# Patient Record
Sex: MALE | Race: OTHER | HISPANIC OR LATINO | Employment: UNEMPLOYED | URBAN - METROPOLITAN AREA
[De-identification: names, ages, dates, MRNs, and addresses within clinical notes are randomized per-mention and may not be internally consistent; named-entity substitution may affect disease eponyms.]

---

## 2017-10-02 ENCOUNTER — GENERIC CONVERSION - ENCOUNTER (OUTPATIENT)
Dept: OTHER | Facility: OTHER | Age: 42
End: 2017-10-02

## 2017-10-02 DIAGNOSIS — R10.13 EPIGASTRIC PAIN: ICD-10-CM

## 2017-10-02 DIAGNOSIS — I10 ESSENTIAL (PRIMARY) HYPERTENSION: ICD-10-CM

## 2017-10-07 ENCOUNTER — HOSPITAL ENCOUNTER (EMERGENCY)
Facility: HOSPITAL | Age: 42
Discharge: HOME | End: 2017-10-07
Attending: EMERGENCY MEDICINE | Admitting: EMERGENCY MEDICINE
Payer: COMMERCIAL

## 2017-10-07 ENCOUNTER — APPOINTMENT (EMERGENCY)
Dept: RADIOLOGY | Facility: HOSPITAL | Age: 42
End: 2017-10-07
Payer: COMMERCIAL

## 2017-10-07 VITALS
WEIGHT: 233 LBS | TEMPERATURE: 98.1 F | HEIGHT: 67 IN | RESPIRATION RATE: 16 BRPM | SYSTOLIC BLOOD PRESSURE: 127 MMHG | OXYGEN SATURATION: 99 % | DIASTOLIC BLOOD PRESSURE: 84 MMHG | HEART RATE: 84 BPM | BODY MASS INDEX: 36.57 KG/M2

## 2017-10-07 DIAGNOSIS — K52.9 GASTROENTERITIS: ICD-10-CM

## 2017-10-07 DIAGNOSIS — R10.9 ABDOMINAL PAIN: Primary | ICD-10-CM

## 2017-10-07 LAB
ALBUMIN SERPL BCP-MCNC: 3.7 G/DL (ref 3.5–5)
ALP SERPL-CCNC: 73 U/L (ref 46–116)
ALT SERPL W P-5'-P-CCNC: 42 U/L (ref 12–78)
ANION GAP SERPL CALCULATED.3IONS-SCNC: 5 MMOL/L (ref 4–13)
AST SERPL W P-5'-P-CCNC: 28 U/L (ref 5–45)
BACTERIA UR QL AUTO: ABNORMAL /HPF
BASOPHILS # BLD AUTO: 0.03 THOUSANDS/ΜL (ref 0–0.1)
BASOPHILS NFR BLD AUTO: 0 % (ref 0–1)
BILIRUB SERPL-MCNC: 0.33 MG/DL (ref 0.2–1)
BILIRUB UR QL STRIP: NEGATIVE
BUN SERPL-MCNC: 14 MG/DL (ref 5–25)
CALCIUM SERPL-MCNC: 8.8 MG/DL (ref 8.3–10.1)
CHLORIDE SERPL-SCNC: 103 MMOL/L (ref 100–108)
CLARITY UR: CLEAR
CO2 SERPL-SCNC: 29 MMOL/L (ref 21–32)
COLOR UR: YELLOW
CREAT SERPL-MCNC: 0.93 MG/DL (ref 0.6–1.3)
EOSINOPHIL # BLD AUTO: 0.18 THOUSAND/ΜL (ref 0–0.61)
EOSINOPHIL NFR BLD AUTO: 2 % (ref 0–6)
ERYTHROCYTE [DISTWIDTH] IN BLOOD BY AUTOMATED COUNT: 13.3 % (ref 11.6–15.1)
GFR SERPL CREATININE-BSD FRML MDRD: 102 ML/MIN/1.73SQ M
GLUCOSE SERPL-MCNC: 113 MG/DL (ref 65–140)
GLUCOSE UR STRIP-MCNC: NEGATIVE MG/DL
HCT VFR BLD AUTO: 44.8 % (ref 36.5–49.3)
HGB BLD-MCNC: 15.4 G/DL (ref 12–17)
HGB UR QL STRIP.AUTO: ABNORMAL
HYALINE CASTS #/AREA URNS LPF: ABNORMAL /LPF
KETONES UR STRIP-MCNC: NEGATIVE MG/DL
LEUKOCYTE ESTERASE UR QL STRIP: NEGATIVE
LIPASE SERPL-CCNC: 194 U/L (ref 73–393)
LYMPHOCYTES # BLD AUTO: 2.49 THOUSANDS/ΜL (ref 0.6–4.47)
LYMPHOCYTES NFR BLD AUTO: 30 % (ref 14–44)
MCH RBC QN AUTO: 30 PG (ref 26.8–34.3)
MCHC RBC AUTO-ENTMCNC: 34.4 G/DL (ref 31.4–37.4)
MCV RBC AUTO: 87 FL (ref 82–98)
MONOCYTES # BLD AUTO: 0.74 THOUSAND/ΜL (ref 0.17–1.22)
MONOCYTES NFR BLD AUTO: 9 % (ref 4–12)
NEUTROPHILS # BLD AUTO: 4.74 THOUSANDS/ΜL (ref 1.85–7.62)
NEUTS SEG NFR BLD AUTO: 59 % (ref 43–75)
NITRITE UR QL STRIP: NEGATIVE
NON-SQ EPI CELLS URNS QL MICRO: ABNORMAL /HPF
NRBC BLD AUTO-RTO: 0 /100 WBCS
PH UR STRIP.AUTO: 7 [PH] (ref 4.5–8)
PLATELET # BLD AUTO: 253 THOUSANDS/UL (ref 149–390)
PMV BLD AUTO: 9.7 FL (ref 8.9–12.7)
POTASSIUM SERPL-SCNC: 3.7 MMOL/L (ref 3.5–5.3)
PROT SERPL-MCNC: 7.5 G/DL (ref 6.4–8.2)
PROT UR STRIP-MCNC: ABNORMAL MG/DL
RBC # BLD AUTO: 5.13 MILLION/UL (ref 3.88–5.62)
RBC #/AREA URNS AUTO: ABNORMAL /HPF
SODIUM SERPL-SCNC: 137 MMOL/L (ref 136–145)
SP GR UR STRIP.AUTO: 1.02 (ref 1–1.03)
UROBILINOGEN UR QL STRIP.AUTO: 0.2 E.U./DL
WBC # BLD AUTO: 8.19 THOUSAND/UL (ref 4.31–10.16)
WBC #/AREA URNS AUTO: ABNORMAL /HPF

## 2017-10-07 PROCEDURE — 99284 EMERGENCY DEPT VISIT MOD MDM: CPT

## 2017-10-07 PROCEDURE — 36415 COLL VENOUS BLD VENIPUNCTURE: CPT | Performed by: EMERGENCY MEDICINE

## 2017-10-07 PROCEDURE — 80053 COMPREHEN METABOLIC PANEL: CPT | Performed by: EMERGENCY MEDICINE

## 2017-10-07 PROCEDURE — 83690 ASSAY OF LIPASE: CPT | Performed by: EMERGENCY MEDICINE

## 2017-10-07 PROCEDURE — 74176 CT ABD & PELVIS W/O CONTRAST: CPT

## 2017-10-07 PROCEDURE — 85025 COMPLETE CBC W/AUTO DIFF WBC: CPT | Performed by: EMERGENCY MEDICINE

## 2017-10-07 PROCEDURE — 81001 URINALYSIS AUTO W/SCOPE: CPT

## 2017-10-07 RX ORDER — LISINOPRIL 5 MG/1
5 TABLET ORAL DAILY
COMMUNITY
End: 2018-07-10 | Stop reason: SDUPTHER

## 2017-10-07 NOTE — DISCHARGE INSTRUCTIONS
Dolor abdominal, cuidados ambulatorios   INFORMACIÓN GENERAL:   El dolor abdominal  puede ser sordo, molesto o [place]  Puede sentir dolor en wilmar wolfgang del abdomen o en todo el abdomen  El dolor puede deberse a ciertos estados juani estreñimiento, sensibilidad o intoxicación alimentaria, infección o wilmar obstrucción  Asimismo, el dolor abdominal puede deberse a wilmar hernia, apendicitis o úlcera  La causa del dolor abdominal puede ser desconocida  Busque cuidados inmediatos para los siguientes síntomas:   · Casper dolor de pecho o falta de aliento    · Dolor pulsátil en el abdomen superior o en la parte inferior de la espalda que de repente es alejandro    · Dolor que se localiza en el abdomen inferior derecho y empeora con el movimiento    · Fiebre por encima de 100 4°F (38°C) o escalofríos martita    · Vómito de todo lo que usted come y scott    · Dolor que no mejora o más kimberley empeora reggie las siguientes 8 a 12 horas    · Josh en el vómito o heces que se estrella negras y alquitranadas    · La piel o el olivares de los ojos se vuelven amarillentos    · Grandes cantidades de sangrado vaginal que no es mtz periodo menstrual  El tratamiento para el dolor abdominal  puede llegar a incluir medicamentos para calmar mtz estómago, prevenir el vómito o disminuir el dolor  Programe wilmar nanda con mtz proveedor de [employer] se le haya indicado: Anote nathan preguntas para que se acuerde de hacerlas reggie nathan visitas  ACUERDOS SOBRE MTZ CUIDADO:   Usted tiene el derecho de participar en la planificación de mtz cuidado  Aprenda todo lo que pueda sobre mtz condición y juani darle tratamiento  Discuta con nathan médicos nathan opciones de tratamiento para juntos decidir el cuidado que usted quiere recibir  Usted siempre tiene el derecho a rechazar mtz tratamiento  Esta información es sólo para uso en educación  Mtz intención no es darle un consejo médico sobre enfermedades o tratamientos   Colsulte con mtz [employer] farm[employer] antes de seguir cualquier régimen médico para saber si es seguro y efectivo para usted  © 2014 [address] [employer] is for End User's use only and may not be sold, redistributed or otherwise used for commercial purposes  All illustrations and images included in CareNotes® are the copyrighted property of A D A M , Inc  or [address]

## 2017-10-07 NOTE — ED NOTES
Dr Jocelyne Landau and Dr Neda Bass at bedside with ultrasound        Emerick Homans, RN  10/07/17 1472

## 2017-10-07 NOTE — ED PROVIDER NOTES
History  Chief Complaint   Patient presents with    Flank Pain     left flank pain since monday, denies any urinary symptoms       This is a 51-year-old Colombian-speaking male with past medical history of hypertension who presents to the emergency department with a 5 day history of left-sided flank left upper quadrant, and epigastric abdominal pain  Patient has never had anything like this in the past describes the pain as a sharp pain that has been waxing and waning in nature symptoms started a radiates to his back at times  Patient denies any urinary symptoms including dysuria or hematuria  Patient denies any nausea or vomiting, constipation, had 1 episode of watery diarrhea this morning  Denies any chest pain or shortness of breath  Patient denies any recent illnesses, fevers, chills, headaches or vision changes  History provided by:  Patient   used: No    Flank Pain   Pain location:  L flank, LUQ and epigastric  Pain quality: sharp    Timing:  Constant  Progression:  Waxing and waning  Chronicity:  New  Relieved by:  Nothing  Worsened by:  Nothing  Ineffective treatments:  None tried  Associated symptoms: diarrhea    Associated symptoms: no chest pain, no chills, no constipation, no dysuria, no fatigue, no fever, no hematuria, no nausea, no shortness of breath, no sore throat and no vomiting        Prior to Admission Medications   Prescriptions Last Dose Informant Patient Reported? Taking?   lisinopril (ZESTRIL) 5 mg tablet   Yes Yes   Sig: Take 5 mg by mouth daily      Facility-Administered Medications: None       Past Medical History:   Diagnosis Date    Depression     Hypertension        No past surgical history on file  No family history on file  I have reviewed and agree with the history as documented      Social History   Substance Use Topics    Smoking status: Current Every Day Smoker    Smokeless tobacco: Never Used    Alcohol use No        Review of Systems Constitutional: Negative for chills, fatigue and fever  HENT: Negative for sore throat  Eyes: Negative for visual disturbance  Respiratory: Negative for shortness of breath  Cardiovascular: Negative for chest pain  Gastrointestinal: Positive for abdominal pain and diarrhea  Negative for blood in stool, constipation, nausea and vomiting  Genitourinary: Positive for flank pain  Negative for difficulty urinating, dysuria and hematuria  Musculoskeletal: Negative for arthralgias  Skin: Negative for rash  Neurological: Negative for syncope, weakness and headaches  Physical Exam  ED Triage Vitals [10/07/17 1125]   Temperature Pulse Respirations Blood Pressure SpO2   98 1 °F (36 7 °C) (!) 106 18 135/71 98 %      Temp Source Heart Rate Source Patient Position - Orthostatic VS BP Location FiO2 (%)   Oral Monitor Sitting Left arm --      Pain Score       8           Physical Exam   Constitutional: He is oriented to person, place, and time  He appears well-developed and well-nourished  HENT:   Head: Normocephalic and atraumatic  Eyes: Conjunctivae and EOM are normal  No scleral icterus  Neck: Normal range of motion  Neck supple  Cardiovascular: Normal rate and regular rhythm  No murmur heard  Pulmonary/Chest: Effort normal and breath sounds normal    Abdominal: Soft  Bowel sounds are normal  There is no tenderness  Musculoskeletal: Normal range of motion  Neurological: He is alert and oriented to person, place, and time  Skin: Skin is warm and dry  Psychiatric: He has a normal mood and affect  Nursing note and vitals reviewed        ED Medications  Medications - No data to display    Diagnostic Studies  Labs Reviewed   URINE MICROSCOPIC - Abnormal        Result Value Ref Range Status    RBC, UA 2-4 (*) None Seen, 0-5 /hpf Final    WBC, UA None Seen  None Seen, 0-5, 5-55, 5-65 /hpf Final    Epithelial Cells None Seen  None Seen, Occasional /hpf Final    Bacteria, UA None Seen None Seen, Occasional /hpf Final    Hyaline Casts, UA None Seen  None Seen /lpf Final   ED URINE MACROSCOPIC - Abnormal     Protein, UA Trace (*) Negative mg/dl Final    Blood, UA Trace (*) Negative Final    Color, UA Yellow   Final    Clarity, UA Clear   Final    pH, UA 7 0  4 5 - 8 0 Final    Leukocytes, UA Negative  Negative Final    Nitrite, UA Negative  Negative Final    Glucose, UA Negative  Negative mg/dl Final    Ketones, UA Negative  Negative mg/dl Final    Urobilinogen, UA 0 2  0 2, 1 0 E U /dl E U /dl Final    Bilirubin, UA Negative  Negative Final    Specific Gravity, UA 1 020  1 003 - 1 030 Final    Narrative:     CLINITEK RESULT   CBC AND DIFFERENTIAL - Normal    WBC 8 19  4 31 - 10 16 Thousand/uL Final    RBC 5 13  3 88 - 5 62 Million/uL Final    Hemoglobin 15 4  12 0 - 17 0 g/dL Final    Hematocrit 44 8  36 5 - 49 3 % Final    MCV 87  82 - 98 fL Final    MCH 30 0  26 8 - 34 3 pg Final    MCHC 34 4  31 4 - 37 4 g/dL Final    RDW 13 3  11 6 - 15 1 % Final    MPV 9 7  8 9 - 12 7 fL Final    Platelets 851  989 - 390 Thousands/uL Final    nRBC 0  /100 WBCs Final    Neutrophils Relative 59  43 - 75 % Final    Lymphocytes Relative 30  14 - 44 % Final    Monocytes Relative 9  4 - 12 % Final    Eosinophils Relative 2  0 - 6 % Final    Basophils Relative 0  0 - 1 % Final    Neutrophils Absolute 4 74  1 85 - 7 62 Thousands/µL Final    Lymphocytes Absolute 2 49  0 60 - 4 47 Thousands/µL Final    Monocytes Absolute 0 74  0 17 - 1 22 Thousand/µL Final    Eosinophils Absolute 0 18  0 00 - 0 61 Thousand/µL Final    Basophils Absolute 0 03  0 00 - 0 10 Thousands/µL Final   LIPASE - Normal    Lipase 194  73 - 393 u/L Final   COMPREHENSIVE METABOLIC PANEL    Sodium 153  136 - 145 mmol/L Final    Potassium 3 7  3 5 - 5 3 mmol/L Final    Chloride 103  100 - 108 mmol/L Final    CO2 29  21 - 32 mmol/L Final    Anion Gap 5  4 - 13 mmol/L Final    BUN 14  5 - 25 mg/dL Final    Creatinine 0 93  0 60 - 1 30 mg/dL Final Comment: Standardized to IDMS reference method    Glucose 113  65 - 140 mg/dL Final    Comment:   If the patient is fasting, the ADA then defines impaired fasting glucose as > 100 mg/dL and diabetes as > or equal to 123 mg/dL  Specimen collection should occur prior to Sulfasalazine administration due to the potential for falsely depressed results  Specimen collection should occur prior to Sulfapyridine administration due to the potential for falsely elevated results  Calcium 8 8  8 3 - 10 1 mg/dL Final    AST 28  5 - 45 U/L Final    Comment:   Specimen collection should occur prior to Sulfasalazine administration due to the potential for falsely depressed results  ALT 42  12 - 78 U/L Final    Comment:   Specimen collection should occur prior to Sulfasalazine and/or Sulfapyridine administration due to the potential for falsely depressed results  Alkaline Phosphatase 73  46 - 116 U/L Final    Total Protein 7 5  6 4 - 8 2 g/dL Final    Albumin 3 7  3 5 - 5 0 g/dL Final    Total Bilirubin 0 33  0 20 - 1 00 mg/dL Final    eGFR 102  ml/min/1 73sq m Final    Narrative:     National Kidney Disease Education Program recommendations are as follows:  GFR calculation is accurate only with a steady state creatinine  Chronic Kidney disease less than 60 ml/min/1 73 sq  meters  Kidney failure less than 15 ml/min/1 73 sq  meters  CT renal stone study abdomen pelvis wo contrast   Final Result      No urinary tract calculi  Workstation performed: [id number]             Procedures  Procedures      Phone Consults  ED Phone Contact    ED Course  ED Course        MDM  Number of Diagnoses or Management Options  Abdominal pain: new and requires workup  Gastroenteritis: new and requires workup  Diagnosis management comments: 72-year-old male with history of hypertension presenting with abdominal pain concerning for pancreatitis versus gastritis versus nephrolithiasis    Will obtain urinalysis as well as abdominal labs including CBC, CMP, lipase  Amount and/or Complexity of Data Reviewed  Clinical lab tests: ordered and reviewed  Tests in the radiology section of CPT®: ordered and reviewed  Tests in the medicine section of CPT®: ordered and reviewed  Review and summarize past medical records: yes  Independent visualization of images, tracings, or specimens: yes      CritCare Time    Disposition  Final diagnoses:   Abdominal pain   Gastroenteritis     ED Disposition     ED Disposition Condition Comment    Discharge  [facility] discharge to home/self care  Condition at discharge: Good        Follow-up Information     Follow up With Specialties Details Why Contact Info Additional Information    [facility] Schedule an appointment as soon as possible for a visit in 2 days  [address]  [id number]  [facility] Emergency Department Emergency Medicine Go to If symptoms worsen [address]  [phone]  ED, [address] [place] [address], [id number]        Discharge Medication List as of 10/7/2017  2:03 PM      CONTINUE these medications which have NOT CHANGED    Details   lisinopril (ZESTRIL) 5 mg tablet Take 5 mg by mouth daily, Historical Med           No discharge procedures on file  ED Provider  Attending physically available and evaluated [facility]  I managed the patient along with the ED Attending      Electronically Signed by       Ana Aparicio MD  Resident  10/07/17 0668

## 2017-10-07 NOTE — ED ATTENDING ATTESTATION
Filemon Vincent MD, saw and evaluated the patient  I have discussed the patient with the resident/non-physician practitioner and agree with the resident's/non-physician practitioner's findings, Plan of Care, and MDM as documented in the resident's/non-physician practitioner's note, except where noted  All available labs and Radiology studies were reviewed  At this point I agree with the current assessment done in the Emergency Department  I have conducted an independent evaluation of this patient a history and physical is as follows: This is a 51-year-old male who presents to the emergency department with left-sided abdominal pain  The patient states that his pain began a few days ago, and was initially a mild bothersome pain  He states that it became stronger, and intermittently feels hot  He says that it waxes and wanes in intensity, sometimes coming on for 10-30 minutes at a time and then fading away  He says the pain radiates to his back only mildly  He states it is not associated with eating, and is made better with pushing on the area  He says the pain is in the left side of his abdomen, and does not radiate to his testicles  He has not had vomiting, dysuria, hematuria, but does report a episode of diarrhea in the morning  It was nonbloody  The patient has never had symptoms like this before  His only past medical history is hypertension, for which he is on an ACE-inhibitor  He has had no recent changes in his medications  His review of systems is otherwise negative in 12 systems were reviewed  On exam Vital signs were reviewed  Patient is awake, alert, interactive  The patient's pupils are equally round reactive to light  Oropharynx is clear with moist mucous membranes  Neck is supple and nontender with no adenopathy or JVD  Heart is regular with no murmurs, rubs, or gallops  Lungs are clear and equal with no wheezes, rales, or rhonchi    Abdomen is soft with mild left lateral abdomen tenderness with no left upper quadrant or left lower quadrant tenderness  The patient also has mild epigastric tenderness with no rebound, guarding, or masses  There is no CVA tenderness  The patient was completely exposed  There is no skin breakdown  There are no rashes or skin changes  Extremities are warm and well perfused with good pulses  The patient has normal strength, sensation, and cranial nerves  Impression:  Abdominal pain, bedside ultrasound with no hydronephrosis, unremarkable gallbladder    We will plan to check labs, renal protocol CT, urine    Critical Care Time  CritCare Time

## 2018-01-22 VITALS
BODY MASS INDEX: 38.19 KG/M2 | HEIGHT: 66 IN | WEIGHT: 237.66 LBS | HEART RATE: 82 BPM | TEMPERATURE: 98.8 F | SYSTOLIC BLOOD PRESSURE: 100 MMHG | DIASTOLIC BLOOD PRESSURE: 80 MMHG

## 2018-01-31 RX ORDER — OMEPRAZOLE 20 MG/1
CAPSULE, DELAYED RELEASE ORAL
Qty: 30 CAPSULE | Refills: 0 | OUTPATIENT
Start: 2018-01-31

## 2018-07-08 PROBLEM — E66.9 OBESITY (BMI 30-39.9): Status: ACTIVE | Noted: 2017-10-02

## 2018-07-08 RX ORDER — OMEPRAZOLE 20 MG/1
1 CAPSULE, DELAYED RELEASE ORAL DAILY
COMMUNITY
Start: 2017-10-02 | End: 2018-07-10 | Stop reason: SDUPTHER

## 2018-07-10 ENCOUNTER — OFFICE VISIT (OUTPATIENT)
Dept: INTERNAL MEDICINE CLINIC | Facility: CLINIC | Age: 43
End: 2018-07-10
Payer: COMMERCIAL

## 2018-07-10 VITALS
BODY MASS INDEX: 37.3 KG/M2 | HEIGHT: 67 IN | WEIGHT: 237.66 LBS | DIASTOLIC BLOOD PRESSURE: 82 MMHG | TEMPERATURE: 98.1 F | SYSTOLIC BLOOD PRESSURE: 124 MMHG | HEART RATE: 76 BPM

## 2018-07-10 DIAGNOSIS — R93.89 ABNORMAL CHEST X-RAY: ICD-10-CM

## 2018-07-10 DIAGNOSIS — R05.9 COUGH: ICD-10-CM

## 2018-07-10 DIAGNOSIS — I10 BENIGN ESSENTIAL HYPERTENSION: Primary | ICD-10-CM

## 2018-07-10 DIAGNOSIS — K62.5 RECTAL BLEEDING: ICD-10-CM

## 2018-07-10 DIAGNOSIS — E66.9 OBESITY (BMI 30-39.9): ICD-10-CM

## 2018-07-10 DIAGNOSIS — R10.13 DYSPEPSIA: ICD-10-CM

## 2018-07-10 PROCEDURE — 3074F SYST BP LT 130 MM HG: CPT | Performed by: PHYSICIAN ASSISTANT

## 2018-07-10 PROCEDURE — 3079F DIAST BP 80-89 MM HG: CPT | Performed by: PHYSICIAN ASSISTANT

## 2018-07-10 PROCEDURE — 3725F SCREEN DEPRESSION PERFORMED: CPT | Performed by: PHYSICIAN ASSISTANT

## 2018-07-10 PROCEDURE — 99213 OFFICE O/P EST LOW 20 MIN: CPT | Performed by: PHYSICIAN ASSISTANT

## 2018-07-10 RX ORDER — LISINOPRIL 5 MG/1
5 TABLET ORAL DAILY
Qty: 90 TABLET | Refills: 1 | Status: SHIPPED | OUTPATIENT
Start: 2018-07-10 | End: 2019-12-17

## 2018-07-10 RX ORDER — OMEPRAZOLE 20 MG/1
20 CAPSULE, DELAYED RELEASE ORAL DAILY
Qty: 30 CAPSULE | Refills: 0 | Status: ON HOLD | OUTPATIENT
Start: 2018-07-10 | End: 2018-10-04 | Stop reason: SDUPTHER

## 2018-07-10 NOTE — PROGRESS NOTES
Assessment/Plan:    No problem-specific Assessment & Plan notes found for this encounter  Diagnoses and all orders for this visit:    Benign essential hypertension  -     lisinopril (ZESTRIL) 5 mg tablet; Take 1 tablet (5 mg total) by mouth daily for 180 days    Obesity (BMI 30-39  9)    Dyspepsia  -     Ambulatory referral to Gastroenterology; Future  -     omeprazole (PriLOSEC) 20 mg delayed release capsule; Take 1 capsule (20 mg total) by mouth daily for 30 days    Rectal bleeding  -     Ambulatory referral to Gastroenterology; Future    Abnormal chest x-ray  -     XR chest pa & lateral; Future    Cough  -     XR chest pa & lateral; Future          Subjective:      Patient ID: Sarah Sheth is a 41 y o  male  Pt here for F/U   Admits got busy and never got his labs completed  Confirms taking BP med but was not taking daily  To make it last     C/O pain LLQ happens 3-4 X a week  Will have a cramping sensation,   States BM is normal 1-2 X a day and no pain with BM but will see some streaks of blood but not every time   Pt denies any pain with BM and the crampy pain not always associated before or after a BM  States this started in March when was in [facility], states that first time saw a lot of blood  States since then the stool is blood streaked not in toilet  Denies any blood any other time  Last episode was 1 week ago  Patient reports the 1st episode while on vacation in [facility] patient admits had a lot of different foods, some of them spicy and did have some beer  Denies ever having constipation  Denies any known FH of IBD  No weight loss    Continues to have epigastic pain and reflux, admits has not always been following the low acid diet    Does have history of hemorrhoids external many years ago        Patient also gives history of a possible abnormal chest x-ray in 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 and thinks may have been affected  Patient denies shortness of breath however he does report a cough at times  Patient denies any hemoptysis  Differential includes but not limited to: internal hemorrhoids, diverticular disease  Will need additional evaluations  Patient be referred to GI as will need both endoscopy for his ongoing refractory epigastric pain and blood in stool  The following portions of the patient's history were reviewed and updated as appropriate: allergies, current medications, past family history, past medical history, past social history, past surgical history and problem list     Review of Systems   Constitutional: Negative for appetite change, chills, fever and unexpected weight change  Eyes: Negative  Respiratory: Positive for cough  As noted in HPI not currently   Cardiovascular: Negative  Negative for chest pain and palpitations  Gastrointestinal: Positive for abdominal pain, blood in stool and rectal pain  Negative for constipation, diarrhea, nausea and vomiting  Endocrine: Negative  Genitourinary: Negative  Negative for dysuria and hematuria  Skin: Negative  Negative for rash  Neurological: Negative for dizziness, syncope and light-headedness  Psychiatric/Behavioral: Negative  Objective:      /82 (BP Location: Left arm, Patient Position: Sitting, Cuff Size: Adult)   Pulse 76   Temp 98 1 °F (36 7 °C) (Oral)   Ht 5' 7" (1 702 m)   Wt 108 kg (237 lb 10 5 oz)   BMI 37 22 kg/m²          Physical Exam   Constitutional: He is oriented to person, place, and time  He appears well-developed and well-nourished  HENT:   Head: Normocephalic and atraumatic  Eyes: Pupils are equal, round, and reactive to light  Cardiovascular: Normal rate, regular rhythm and normal heart sounds  No murmur heard  Pulmonary/Chest: Effort normal and breath sounds normal  He has no wheezes  Abdominal: Soft   Bowel sounds are normal  He exhibits no mass  There is tenderness (minimal discomfort with deep palpation LLQ)  There is no rebound and no guarding  Neurological: He is alert and oriented to person, place, and time  Skin: Skin is warm and dry  Psychiatric: He has a normal mood and affect   His behavior is normal

## 2018-07-10 NOTE — PATIENT INSTRUCTIONS
Continue Lisinopril daily  Take the omeprazole as discussed and resume low acid diet   Sched with GI as refractory reflux symptoms and rectal bleeding  Get your labs completed for blood pressure

## 2018-07-18 ENCOUNTER — OFFICE VISIT (OUTPATIENT)
Dept: GASTROENTEROLOGY | Facility: CLINIC | Age: 43
End: 2018-07-18
Payer: COMMERCIAL

## 2018-07-18 VITALS
TEMPERATURE: 98.1 F | WEIGHT: 237.6 LBS | HEART RATE: 92 BPM | SYSTOLIC BLOOD PRESSURE: 115 MMHG | BODY MASS INDEX: 37.29 KG/M2 | HEIGHT: 67 IN | DIASTOLIC BLOOD PRESSURE: 78 MMHG

## 2018-07-18 DIAGNOSIS — K62.5 RECTAL BLEEDING: ICD-10-CM

## 2018-07-18 DIAGNOSIS — R10.13 DYSPEPSIA: ICD-10-CM

## 2018-07-18 PROCEDURE — 99244 OFF/OP CNSLTJ NEW/EST MOD 40: CPT | Performed by: INTERNAL MEDICINE

## 2018-07-18 NOTE — PATIENT INSTRUCTIONS
Colon EGD scheduled with Dr Rajeev Juárez 9/6/18 at [address] [place] [address] instructions given in office

## 2018-07-18 NOTE — LETTER
July 19, 2018     Yony Bran PA-C  [address] [facility]    Patient: Isiah Bansal   YOB: 1975   Date of Visit: 7/18/2018       Dear Dr Kishan Vásquez: Thank you for referring Isiah Bansal to me for evaluation  Below are my notes for this consultation  If you have questions, please do not hesitate to call me  I look forward to following your patient along with you  Sincerely,        Alverto Lozoya MD        CC: No Recipients  Alverto Lozoya MD  7/18/2018  5:18 PM  Attested  [facility] - Outpatient Consultation  Isiah Bansal 41 y o  male MRN: [id number]  Encounter: [id number]          ASSESSMENT AND PLAN:      1  Dyspepsia  -suspect patient has underlying gastroesophageal reflux disease with possible ulcer, r/o Barretts  -recommend omeprazole 40 mg daily  -recommend upper endoscopy with biopsy to rule out H pylori given longstanding history of GERD symptoms plus alarm symptoms that include blood in stool  - Ambulatory referral to Gastroenterology    2  Rectal bleeding  -recommend colonoscopy to rule out diverticulosis, polyps, cancer, hemorrhoids    - Ambulatory referral to Gastroenterology    ______________________________________________________________________    HPI:  62-year-old male with history of hypertension tobacco abuse history of reflux times 20 years also complaining of left quadrant pain associated with occasional bloody bowel movements  Patient is about a half a pack per day smoker, denies NSAID use, alcohol use about 6 pack per week      Reflux:  -on set 20 years  -severity- moderate to severe, occurring daily  -some improvement with Nexium, worse with eating certain foods  -associated with epigastric pain left quadrant pain and blood in stool, denies nausea/vomiting, difficulty swallowing or pain upon swallowing, denies weight loss  -no previous endoscopy    Blood in stool:  -noticed blood mixed with stool as well as on tolerate toilet paper started about a year ago  -associated with left quadrant pain  -denies family history of colon cancer, no previous colonoscopy      REVIEW OF SYSTEMS:    CONSTITUTIONAL: Denies any fever, chills, rigors, and weight loss  HEENT: No earache or tinnitus  Denies hearing loss or visual disturbances  CARDIOVASCULAR: No chest pain or palpitations  RESPIRATORY: Denies any cough, hemoptysis, shortness of breath or dyspnea on exertion  GASTROINTESTINAL: As noted in the History of Present Illness  GENITOURINARY: No problems with urination  Denies any hematuria or dysuria  NEUROLOGIC: No dizziness or vertigo, denies headaches  MUSCULOSKELETAL: Denies any muscle or joint pain  SKIN: Denies skin rashes or itching  ENDOCRINE: Denies excessive thirst  Denies intolerance to heat or cold  PSYCHOSOCIAL: Denies depression or anxiety  Denies any recent memory loss         Historical Information   Past Medical History:   Diagnosis Date    Back pain, thoracic     Last Assessed [id number]    Degeneration of cervical intervertebral disc     Last Assessed [id number]    Depression     Hypertension     Myofascial pain syndrome     Last Assessed [id number]    Neck pain     Last Assessed [id number]    Trauma     Rectum    Upper back pain     Last Assessed [id number]     Past Surgical History:   Procedure Laterality Date    CARPAL TUNNEL RELEASE       Social History   History   Alcohol Use    Yes     Comment: Socially     History   Drug Use No     History   Smoking Status    Current Every Day Smoker    Packs/day: 0 50   Smokeless Tobacco    Never Used     Comment: less than 1 pack a day     Family History   Problem Relation Age of Onset    Diabetes Family     Hypertension Family     Other Family         Back Problems    Breast cancer Maternal Grandmother     Breast cancer Paternal Aunt        Meds/Allergies       Current Outpatient Prescriptions:     lisinopril (ZESTRIL) 5 mg tablet   omeprazole (PriLOSEC) 20 mg delayed release capsule    Allergies   Allergen Reactions    Dust Mite Extract     Morphine     Penicillins     Pollen Extract            Objective     Blood pressure 115/78, pulse 92, temperature 98 1 °F (36 7 °C), temperature source Tympanic, height 5' 7" (1 702 m), weight 108 kg (237 lb 9 6 oz)  Body mass index is 37 21 kg/m²  PHYSICAL EXAM:      General Appearance:   Alert, cooperative, no distress,obese   HEENT:   Normocephalic, atraumatic, anicteric      Neck:  Supple, symmetrical, trachea midline   Lungs:   Clear to auscultation bilaterally; no rales, rhonchi or wheezing; respirations unlabored    Heart[de-identified]   Regular rate and rhythm; no murmur, rub, or gallop  Abdomen:   Soft, mild-to-moderate tenderness in epigastric and left upper quadrant areas, non-distended; normal bowel sounds; no masses, no organomegaly    Genitalia:   Deferred    Rectal:   Deferred    Extremities:  No cyanosis, clubbing or edema    Pulses:  2+ and symmetric    Skin:  No jaundice, rashes, or lesions    Lymph nodes:  No palpable cervical lymphadenopathy        Lab Results:   No visits with results within 1 Day(s) from this visit     Latest known visit with results is:   Admission on 10/07/2017, Discharged on 10/07/2017   Component Date Value    WBC 10/07/2017 8 19     RBC 10/07/2017 5 13     Hemoglobin 10/07/2017 15 4     Hematocrit 10/07/2017 44 8     MCV 10/07/2017 87     MCH 10/07/2017 30 0     MCHC 10/07/2017 34 4     RDW 10/07/2017 13 3     MPV 10/07/2017 9 7     Platelets 15/67/0982 253     nRBC 10/07/2017 0     Neutrophils Relative 10/07/2017 59     Lymphocytes Relative 10/07/2017 30     Monocytes Relative 10/07/2017 9     Eosinophils Relative 10/07/2017 2     Basophils Relative 10/07/2017 0     Neutrophils Absolute 10/07/2017 4 74     Lymphocytes Absolute 10/07/2017 2 49     Monocytes Absolute 10/07/2017 0 74     Eosinophils Absolute 10/07/2017 0 18     Basophils Absolute 10/07/2017 0 03     Sodium 10/07/2017 137     Potassium 10/07/2017 3 7     Chloride 10/07/2017 103     CO2 10/07/2017 29     Anion Gap 10/07/2017 5     BUN 10/07/2017 14     Creatinine 10/07/2017 0 93     Glucose 10/07/2017 113     Calcium 10/07/2017 8 8     AST 10/07/2017 28     ALT 10/07/2017 42     Alkaline Phosphatase 10/07/2017 73     Total Protein 10/07/2017 7 5     Albumin 10/07/2017 3 7     Total Bilirubin 10/07/2017 0 33     eGFR 10/07/2017 102     Lipase 10/07/2017 194     Color, UA 10/07/2017 Yellow     Clarity, UA 10/07/2017 Clear     pH, UA 10/07/2017 7 0     Leukocytes, UA 10/07/2017 Negative     Nitrite, UA 10/07/2017 Negative     Protein, UA 10/07/2017 Trace*    Glucose, UA 10/07/2017 Negative     Ketones, UA 10/07/2017 Negative     Urobilinogen, UA 10/07/2017 0 2     Bilirubin, UA 10/07/2017 Negative     Blood, UA 10/07/2017 Trace*    Specific Gravity, UA 10/07/2017 1 020     RBC, UA 10/07/2017 2-4*    WBC, UA 10/07/2017 None Seen     Epithelial Cells 10/07/2017 None Seen     Bacteria, UA 10/07/2017 None Seen     Hyaline Casts, UA 10/07/2017 None Seen          Radiology Results:   No results found  Attestation signed by Sridhar Maravilla MD at 7/19/2018 12:09 AM:  I reviewed the care furnished by the Gastroenterology fellow Dr Rhea Ochoa during the visit  I was present in the office and personally saw and examined the patient and agree with his assessment and plan  Will plan for an upper endoscopy to evaluate his reflux and epigastric discomfort and rule out peptic ulcer disease and Dickerson's esophagus  We will plan for colonoscopy to evaluate the rectal bleeding although it is most likely due to hemorrhoids        Sridhar Maravilla MD

## 2018-07-18 NOTE — PROGRESS NOTES
[facility] - Outpatient Consultation  Carmella Ellsworth 41 y o  male MRN: [id number]  Encounter: [id number]          ASSESSMENT AND PLAN:      1  Dyspepsia  -suspect patient has underlying gastroesophageal reflux disease with possible ulcer, r/o Barretts  -recommend omeprazole 40 mg daily  -recommend upper endoscopy with biopsy to rule out H pylori given longstanding history of GERD symptoms plus alarm symptoms that include blood in stool  - Ambulatory referral to Gastroenterology    2  Rectal bleeding  -recommend colonoscopy to rule out diverticulosis, polyps, cancer, hemorrhoids    - Ambulatory referral to Gastroenterology    ______________________________________________________________________    HPI:  47-year-old male with history of hypertension tobacco abuse history of reflux times 20 years also complaining of left quadrant pain associated with occasional bloody bowel movements  Patient is about a half a pack per day smoker, denies NSAID use, alcohol use about 6 pack per week  Reflux:  -on set 20 years  -severity- moderate to severe, occurring daily  -some improvement with Nexium, worse with eating certain foods  -associated with epigastric pain left quadrant pain and blood in stool, denies nausea/vomiting, difficulty swallowing or pain upon swallowing, denies weight loss  -no previous endoscopy    Blood in stool:  -noticed blood mixed with stool as well as on tolerate toilet paper started about a year ago  -associated with left quadrant pain  -denies family history of colon cancer, no previous colonoscopy      REVIEW OF SYSTEMS:    CONSTITUTIONAL: Denies any fever, chills, rigors, and weight loss  HEENT: No earache or tinnitus  Denies hearing loss or visual disturbances  CARDIOVASCULAR: No chest pain or palpitations  RESPIRATORY: Denies any cough, hemoptysis, shortness of breath or dyspnea on exertion  GASTROINTESTINAL: As noted in the History of Present Illness  GENITOURINARY: No problems with urination  Denies any hematuria or dysuria  NEUROLOGIC: No dizziness or vertigo, denies headaches  MUSCULOSKELETAL: Denies any muscle or joint pain  SKIN: Denies skin rashes or itching  ENDOCRINE: Denies excessive thirst  Denies intolerance to heat or cold  PSYCHOSOCIAL: Denies depression or anxiety  Denies any recent memory loss  Historical Information   Past Medical History:   Diagnosis Date    Back pain, thoracic     Last Assessed [id number]    Degeneration of cervical intervertebral disc     Last Assessed [id number]    Depression     Hypertension     Myofascial pain syndrome     Last Assessed [id number]    Neck pain     Last Assessed [id number]    Trauma     Rectum    Upper back pain     Last Assessed [id number]     Past Surgical History:   Procedure Laterality Date    CARPAL TUNNEL RELEASE       Social History   History   Alcohol Use    Yes     Comment: Socially     History   Drug Use No     History   Smoking Status    Current Every Day Smoker    Packs/day: 0 50   Smokeless Tobacco    Never Used     Comment: less than 1 pack a day     Family History   Problem Relation Age of Onset    Diabetes Family     Hypertension Family     Other Family         Back Problems    Breast cancer Maternal Grandmother     Breast cancer Paternal Aunt        Meds/Allergies       Current Outpatient Prescriptions:     lisinopril (ZESTRIL) 5 mg tablet    omeprazole (PriLOSEC) 20 mg delayed release capsule    Allergies   Allergen Reactions    Dust Mite Extract     Morphine     Penicillins     Pollen Extract            Objective     Blood pressure 115/78, pulse 92, temperature 98 1 °F (36 7 °C), temperature source Tympanic, height 5' 7" (1 702 m), weight 108 kg (237 lb 9 6 oz)  Body mass index is 37 21 kg/m²          PHYSICAL EXAM:      General Appearance:   Alert, cooperative, no distress,obese   HEENT:   Normocephalic, atraumatic, anicteric      Neck:  Supple, symmetrical, trachea midline   Lungs:   Clear to auscultation bilaterally; no rales, rhonchi or wheezing; respirations unlabored    Heart[de-identified]   Regular rate and rhythm; no murmur, rub, or gallop  Abdomen:   Soft, mild-to-moderate tenderness in epigastric and left upper quadrant areas, non-distended; normal bowel sounds; no masses, no organomegaly    Genitalia:   Deferred    Rectal:   Deferred    Extremities:  No cyanosis, clubbing or edema    Pulses:  2+ and symmetric    Skin:  No jaundice, rashes, or lesions    Lymph nodes:  No palpable cervical lymphadenopathy        Lab Results:   No visits with results within 1 Day(s) from this visit     Latest known visit with results is:   Admission on 10/07/2017, Discharged on 10/07/2017   Component Date Value    WBC 10/07/2017 8 19     RBC 10/07/2017 5 13     Hemoglobin 10/07/2017 15 4     Hematocrit 10/07/2017 44 8     MCV 10/07/2017 87     MCH 10/07/2017 30 0     MCHC 10/07/2017 34 4     RDW 10/07/2017 13 3     MPV 10/07/2017 9 7     Platelets 57/12/2229 253     nRBC 10/07/2017 0     Neutrophils Relative 10/07/2017 59     Lymphocytes Relative 10/07/2017 30     Monocytes Relative 10/07/2017 9     Eosinophils Relative 10/07/2017 2     Basophils Relative 10/07/2017 0     Neutrophils Absolute 10/07/2017 4 74     Lymphocytes Absolute 10/07/2017 2 49     Monocytes Absolute 10/07/2017 0 74     Eosinophils Absolute 10/07/2017 0 18     Basophils Absolute 10/07/2017 0 03     Sodium 10/07/2017 137     Potassium 10/07/2017 3 7     Chloride 10/07/2017 103     CO2 10/07/2017 29     Anion Gap 10/07/2017 5     BUN 10/07/2017 14     Creatinine 10/07/2017 0 93     Glucose 10/07/2017 113     Calcium 10/07/2017 8 8     AST 10/07/2017 28     ALT 10/07/2017 42     Alkaline Phosphatase 10/07/2017 73     Total Protein 10/07/2017 7 5     Albumin 10/07/2017 3 7     Total Bilirubin 10/07/2017 0 33     eGFR 10/07/2017 102     Lipase 10/07/2017 194     Color, UA 10/07/2017 Yellow     Clarity, UA 10/07/2017 Clear     pH, UA 10/07/2017 7 0     Leukocytes, UA 10/07/2017 Negative     Nitrite, UA 10/07/2017 Negative     Protein, UA 10/07/2017 Trace*    Glucose, UA 10/07/2017 Negative     Ketones, UA 10/07/2017 Negative     Urobilinogen, UA 10/07/2017 0 2     Bilirubin, UA 10/07/2017 Negative     Blood, UA 10/07/2017 Trace*    Specific Gravity, UA 10/07/2017 1 020     RBC, UA 10/07/2017 2-4*    WBC, UA 10/07/2017 None Seen     Epithelial Cells 10/07/2017 None Seen     Bacteria, UA 10/07/2017 None Seen     Hyaline Casts, UA 10/07/2017 None Seen          Radiology Results:   No results found

## 2018-09-28 ENCOUNTER — TRANSCRIBE ORDERS (OUTPATIENT)
Dept: ADMINISTRATIVE | Age: 43
End: 2018-09-28

## 2018-09-28 ENCOUNTER — APPOINTMENT (OUTPATIENT)
Dept: RADIOLOGY | Age: 43
End: 2018-09-28
Attending: PREVENTIVE MEDICINE

## 2018-09-28 DIAGNOSIS — R76.11 POSITIVE PPD: ICD-10-CM

## 2018-09-28 DIAGNOSIS — R76.11 POSITIVE PPD: Primary | ICD-10-CM

## 2018-09-28 PROCEDURE — 71045 X-RAY EXAM CHEST 1 VIEW: CPT

## 2018-10-03 ENCOUNTER — ANESTHESIA EVENT (OUTPATIENT)
Dept: GASTROENTEROLOGY | Facility: HOSPITAL | Age: 43
End: 2018-10-03
Payer: COMMERCIAL

## 2018-10-04 ENCOUNTER — HOSPITAL ENCOUNTER (OUTPATIENT)
Facility: HOSPITAL | Age: 43
Setting detail: OUTPATIENT SURGERY
Discharge: HOME | End: 2018-10-04
Attending: INTERNAL MEDICINE | Admitting: INTERNAL MEDICINE
Payer: COMMERCIAL

## 2018-10-04 ENCOUNTER — ANESTHESIA (OUTPATIENT)
Dept: GASTROENTEROLOGY | Facility: HOSPITAL | Age: 43
End: 2018-10-04
Payer: COMMERCIAL

## 2018-10-04 VITALS
RESPIRATION RATE: 20 BRPM | DIASTOLIC BLOOD PRESSURE: 89 MMHG | BODY MASS INDEX: 36.1 KG/M2 | TEMPERATURE: 97.6 F | WEIGHT: 230 LBS | HEIGHT: 67 IN | HEART RATE: 76 BPM | SYSTOLIC BLOOD PRESSURE: 150 MMHG | OXYGEN SATURATION: 100 %

## 2018-10-04 DIAGNOSIS — R10.13 DYSPEPSIA: ICD-10-CM

## 2018-10-04 DIAGNOSIS — K62.5 RECTAL BLEEDING: ICD-10-CM

## 2018-10-04 PROCEDURE — 88305 TISSUE EXAM BY PATHOLOGIST: CPT | Performed by: PATHOLOGY

## 2018-10-04 PROCEDURE — 45385 COLONOSCOPY W/LESION REMOVAL: CPT | Performed by: INTERNAL MEDICINE

## 2018-10-04 PROCEDURE — 88342 IMHCHEM/IMCYTCHM 1ST ANTB: CPT | Performed by: PATHOLOGY

## 2018-10-04 PROCEDURE — 43239 EGD BIOPSY SINGLE/MULTIPLE: CPT | Performed by: INTERNAL MEDICINE

## 2018-10-04 PROCEDURE — 88312 SPECIAL STAINS GROUP 1: CPT | Performed by: PATHOLOGY

## 2018-10-04 RX ORDER — KETAMINE HYDROCHLORIDE 50 MG/ML
INJECTION, SOLUTION, CONCENTRATE INTRAMUSCULAR; INTRAVENOUS AS NEEDED
Status: DISCONTINUED | OUTPATIENT
Start: 2018-10-04 | End: 2018-10-04 | Stop reason: SURG

## 2018-10-04 RX ORDER — SODIUM CHLORIDE, SODIUM LACTATE, POTASSIUM CHLORIDE, CALCIUM CHLORIDE 600; 310; 30; 20 MG/100ML; MG/100ML; MG/100ML; MG/100ML
125 INJECTION, SOLUTION INTRAVENOUS CONTINUOUS
Status: DISCONTINUED | OUTPATIENT
Start: 2018-10-04 | End: 2018-10-04 | Stop reason: HOSPADM

## 2018-10-04 RX ORDER — PROPOFOL 10 MG/ML
INJECTION, EMULSION INTRAVENOUS AS NEEDED
Status: DISCONTINUED | OUTPATIENT
Start: 2018-10-04 | End: 2018-10-04 | Stop reason: SURG

## 2018-10-04 RX ORDER — MIDAZOLAM HYDROCHLORIDE 1 MG/ML
INJECTION INTRAMUSCULAR; INTRAVENOUS AS NEEDED
Status: DISCONTINUED | OUTPATIENT
Start: 2018-10-04 | End: 2018-10-04 | Stop reason: SURG

## 2018-10-04 RX ORDER — OMEPRAZOLE 20 MG/1
20 CAPSULE, DELAYED RELEASE ORAL DAILY
Qty: 30 CAPSULE | Refills: 5 | Status: SHIPPED | OUTPATIENT
Start: 2018-10-04 | End: 2018-11-03

## 2018-10-04 RX ORDER — PROPOFOL 10 MG/ML
INJECTION, EMULSION INTRAVENOUS CONTINUOUS PRN
Status: DISCONTINUED | OUTPATIENT
Start: 2018-10-04 | End: 2018-10-04 | Stop reason: SURG

## 2018-10-04 RX ORDER — SODIUM CHLORIDE 9 MG/ML
50 INJECTION, SOLUTION INTRAVENOUS CONTINUOUS
Status: DISCONTINUED | OUTPATIENT
Start: 2018-10-04 | End: 2018-10-04 | Stop reason: HOSPADM

## 2018-10-04 RX ADMIN — PROPOFOL 100 MG: 10 INJECTION, EMULSION INTRAVENOUS at 08:49

## 2018-10-04 RX ADMIN — PROPOFOL 120 MCG/KG/MIN: 10 INJECTION, EMULSION INTRAVENOUS at 08:45

## 2018-10-04 RX ADMIN — SODIUM CHLORIDE: 0.9 INJECTION, SOLUTION INTRAVENOUS at 09:55

## 2018-10-04 RX ADMIN — KETAMINE HYDROCHLORIDE 50 MG: 50 INJECTION, SOLUTION INTRAMUSCULAR; INTRAVENOUS at 09:00

## 2018-10-04 RX ADMIN — SODIUM CHLORIDE 50 ML/HR: 0.9 INJECTION, SOLUTION INTRAVENOUS at 08:26

## 2018-10-04 RX ADMIN — SODIUM CHLORIDE: 0.9 INJECTION, SOLUTION INTRAVENOUS at 08:27

## 2018-10-04 RX ADMIN — PROPOFOL 130 MG: 10 INJECTION, EMULSION INTRAVENOUS at 08:44

## 2018-10-04 RX ADMIN — PROPOFOL 100 MG: 10 INJECTION, EMULSION INTRAVENOUS at 08:55

## 2018-10-04 RX ADMIN — MIDAZOLAM 2 MG: 1 INJECTION INTRAMUSCULAR; INTRAVENOUS at 09:01

## 2018-10-04 NOTE — H&P
History and Physical -  [facility]  Israel Shipman 41 y o  male MRN: [id number]                  HPI: Israel Shipman is a 41y o  year old male who presents for EGD/colonoscopy for evaluation of dyspepsia and rectal bleeding  Patient has never had a previous colonoscopy or endoscopy  REVIEW OF SYSTEMS: Per the HPI, and otherwise unremarkable  Historical Information   Past Medical History:   Diagnosis Date    Anxiety     mild    Back pain, thoracic     Last Assessed [id number]    Degeneration of cervical intervertebral disc     Last Assessed [id number]    Depression     Hypertension     Myofascial pain syndrome     Last Assessed [id number]    Neck pain     Last Assessed [id number]    Trauma     Rectum    Upper back pain     Last Assessed [id number]     Past Surgical History:   Procedure Laterality Date    CARPAL TUNNEL RELEASE       Social History   History   Alcohol Use    Yes     Comment: Socially     History   Drug Use No     History   Smoking Status    Current Every Day Smoker    Packs/day: 0 50    Years: 20 00    Types: Cigarettes   Smokeless Tobacco    Never Used     Comment: less than 1 pack a day     Family History   Problem Relation Age of Onset    Diabetes Family     Hypertension Family     Other Family         Back Problems    Breast cancer Maternal Grandmother     Breast cancer Paternal Aunt        Meds/Allergies     Prescriptions Prior to Admission   Medication    lisinopril (ZESTRIL) 5 mg tablet    omeprazole (PriLOSEC) 20 mg delayed release capsule       Allergies   Allergen Reactions    Dust Mite Extract     Morphine     Penicillins     Pollen Extract        Objective     Blood pressure 113/70, pulse 80, temperature 97 6 °F (36 4 °C), temperature source Tympanic, resp  rate 18, height 5' 7" (1 702 m), weight 104 kg (230 lb), SpO2 99 %        PHYSICAL EXAM    Gen: NAD  CV: RRR  CHEST: Clear  ABD: soft, NT/ND  EXT: no edema      ASSESSMENT/PLAN:  This is a 41y o  year old male here for EGD/colonoscopy for evaluation of dyspepsia and rectal bleeding    PLAN:   Procedure:   EGD/colonoscopy

## 2018-10-04 NOTE — ANESTHESIA POSTPROCEDURE EVALUATION
Post-Op Assessment Note      CV Status:  Stable    Mental Status:  Somnolent    Hydration Status:  Stable    PONV Controlled:  None    Airway Patency:  Patent    Post Op Vitals Reviewed: Yes          Staff: Anesthesiologist, CRNA           BP      Temp      Pulse     Resp      SpO2

## 2018-10-04 NOTE — H&P
History and Physical - [facility]  Dhiraj Joaquin 41 y o  male MRN: [id number]            HPI: Dhiraj Joaquin is a 41y o  year old male who presents for EGD due to abdominal pain and GERD and colonoscopy due to rectal bleeding  He is a smoker  REVIEW OF SYSTEMS: Per the HPI, and otherwise unremarkable  Historical Information   Past Medical History:   Diagnosis Date    Back pain, thoracic     Last Assessed [id number]    Degeneration of cervical intervertebral disc     Last Assessed [id number]    Depression     Hypertension     Myofascial pain syndrome     Last Assessed [id number]    Neck pain     Last Assessed [id number]    Trauma     Rectum    Upper back pain     Last Assessed [id number]     Past Surgical History:   Procedure Laterality Date    CARPAL TUNNEL RELEASE       Social History   History   Alcohol Use    Yes     Comment: Socially     History   Drug Use No     History   Smoking Status    Current Every Day Smoker    Packs/day: 0 50   Smokeless Tobacco    Never Used     Comment: less than 1 pack a day     Family History   Problem Relation Age of Onset    Diabetes Family     Hypertension Family     Other Family         Back Problems    Breast cancer Maternal Grandmother     Breast cancer Paternal Aunt        Meds/Allergies     Prescriptions Prior to Admission   Medication    lisinopril (ZESTRIL) 5 mg tablet    omeprazole (PriLOSEC) 20 mg delayed release capsule       Allergies   Allergen Reactions    Dust Mite Extract     Morphine     Penicillins     Pollen Extract        Objective     Blood pressure 113/70, pulse 80, temperature 97 6 °F (36 4 °C), temperature source Tympanic, resp  rate 18, height 5' 7" (1 702 m), weight 104 kg (230 lb), SpO2 99 %        PHYSICAL EXAM    Gen: NAD  CV: RRR  CHEST: Clear  ABD: soft, NT/ND  EXT: no edema      ASSESSMENT/PLAN:  This is a 41y o  year old male here for EGD and colonoscopy, and he is stable and optimized for his procedure

## 2018-10-04 NOTE — OP NOTE
OPERATIVE REPORT  PATIENT NAME: Darian Villasenor    :  1975  MRN: [id number]  Pt Location:  GI ROOM 03    SURGERY DATE: 10/4/2018    Surgeon(s) and Role:     Greta Corral,  - Primary    Preop Diagnosis:  Dyspepsia [R10 13]  Rectal bleeding [K62 5]    Post-Op Diagnosis Codes: * Dyspepsia [R10 13]     * Rectal bleeding [K62 5]    Procedure(s) (LRB):  EGD AND COLONOSCOPY (N/A)    Specimen(s):  ID Type Source Tests Collected by Time Destination   1 : Duodenum-cold bx Tissue Duodenum TISSUE EXAM Mary Chaput,  10/4/2018 6319    2 : Duodenal bulb-cold bx Tissue Duodenum TISSUE EXAM Rochester Chaput, DO 10/4/2018 0855    3 : Gastric body-cold bx Tissue Stomach TISSUE EXAM Rochester Chaput, DO 10/4/2018 0857    4 : Mid Esophagus white spots-cold bx Tissue Esophagus TISSUE EXAM Lashaun Diaz, DO 10/4/2018 0902        Estimated Blood Loss:   Minimal    Drains:       Anesthesia Type:   IV Sedation with Anesthesia    Operative Indications:  Dyspepsia [R10 13]  Rectal bleeding [K62 5]      Operative Findings:    ESOPHAGOGASTRODUODENOSCOPY    PROCEDURE: EGD    SEDATION: Monitored anesthesia care, check anesthesia records    ASA Class: 2    INDICATIONS:  Abdominal pain, dyspepsia    CONSENT:  Informed consent was obtained for the procedure, including sedation after explaining the risks and benefits of the procedure  Risks including but not limited to bleeding, perforation, infection, and missed lesion  PREPARATION:   Telemetry, pulse oximetry, blood pressure were monitored throughout the procedure  Patient was identified by myself both verbally and by visual inspection of ID band  DESCRIPTION:   Patient was placed in the left lateral decubitus position and was sedated with the above medication  The gastroscope was introduced in to the oropharynx and the esophagus was intubated under direct visualization  Scope was passed down the esophagus up to 2nd part of the duodenum   A careful inspection was made as the gastroscope was withdrawn, including a retroflexed view of the stomach; findings and interventions are described below  FINDINGS:    #1  Esophagus- white dots in the middle of the esophagus, biopsied with cold biopsy forceps    #2  Stomach- gastritis in the body of the stomach, biopsied with cold biopsy forceps    #3  Duodenum- normal 1st and 2nd part of the duodenum, biopsied with cold biopsy forceps in 2 separate jars in the bulb and 2nd part of the duodenum         IMPRESSIONS:      Gastritis  Biopsied  White dots in the esophagus  Biopsied  Normal duodenum  Biopsied  RECOMMENDATIONS:     Await pathology results  Proceed with colonoscopy  COMPLICATIONS:  None; patient tolerated the procedure well  DISPOSITION: PACU           CONDITION: Stable      Colonoscopy Procedure Note    Procedure: Colonoscopy    Sedation: Monitored anesthesia care, check anesthesia records      ASA Class: 2    INDICATIONS:  Rectal bleeding    POST-OP DIAGNOSIS: See the impression below    Procedure Details     Prior colonoscopy: No prior colonoscopy  Informed consent was obtained for the procedure, including sedation  Risks of perforation, hemorrhage, adverse drug reaction and aspiration were discussed  The patient was placed in the left lateral decubitus position  Based on the pre-procedure assessment, including review of the patient's medical history, medications, allergies, and review of systems, he had been deemed to be an appropriate candidate for conscious sedation; he was therefore sedated with the medications listed below  The patient was monitored continuously with telemetry, pulse oximetry, blood pressure monitoring, and direct observations  A rectal examination was performed  The colonoscope was inserted into the rectum and advanced under direct vision to the cecum, which was identified by the ileocecal valve and appendiceal orifice    The quality of the colonic preparation was good   A careful inspection was made as the colonoscope was withdrawn, including a retroflexed view of the rectum; findings and interventions are described below  Findings:  1  Pedunculated polyp at least 10 mm in distal sigmoid removed by hot snare polypectomy  2  Pedunculated 10 mm rectal polyp removed by hot snare polypectomy  3  Sessile flat polyp 5 mm polyp in the rectum removed by cold snare polypectomy  4  Small internal hemorrhoids           Complications: None; patient tolerated the procedure well  Impression:    1  Pedunculated polyp 10 mm in distal sigmoid removed by hot snare polypectomy  2  Pedunculated 10 mm rectal polyp removed by hot snare polypectomy  3  Sessile flat polyp 5 mm polyp in the rectum removed by cold snare polypectomy  4  Small internal hemorrhoids             Recommendations: Follow-up biopsies   Repeat colonoscopy in 3 years or sooner if clinically indicated  Repeat colonoscopy is being recommended at an interval of less than 10 years, this is because of a personal history of polyps or colon cancer               SIGNATURE: Reginaldo West DO  DATE: October 4, 2018  TIME: 9:06 AM

## 2018-10-04 NOTE — DISCHARGE INSTR - AVS FIRST PAGE
OPERATIVE REPORT  PATIENT NAME: Lucian Duque    :  1975  MRN: [id number]  Pt Location: [facility]    SURGERY DATE: 10/4/2018    Surgeon(s) and Role:     Fidencio Rivas DO - Primary    Preop Diagnosis:  Dyspepsia [R10 13]  Rectal bleeding [K62 5]    Post-Op Diagnosis Codes: * Dyspepsia [R10 13]     * Rectal bleeding [K62 5]    Procedure(s) (LRB):  EGD AND COLONOSCOPY (N/A)    Specimen(s):  ID Type Source Tests Collected by Time Destination   1 : Duodenum-cold bx Tissue Duodenum TISSUE EXAM Mary Wray, DO 10/4/2018 1572    2 : Duodenal bulb-cold bx Tissue Duodenum TISSUE EXAM Mary Chaput, DO 10/4/2018 0855    3 : Gastric body-cold bx Tissue Stomach TISSUE EXAM Maryjusto Wray, DO 10/4/2018 0857    4 : Mid Esophagus white spots-cold bx Tissue Esophagus TISSUE EXAM Anahi Palacio, DO 10/4/2018 0902        Estimated Blood Loss:   Minimal    Drains:       Anesthesia Type:   IV Sedation with Anesthesia    Operative Indications:  Dyspepsia [R10 13]  Rectal bleeding [K62 5]      Operative Findings:    ESOPHAGOGASTRODUODENOSCOPY    PROCEDURE: EGD    SEDATION: Monitored anesthesia care, check anesthesia records    ASA Class: 2    INDICATIONS:  Abdominal pain, dyspepsia    CONSENT:  Informed consent was obtained for the procedure, including sedation after explaining the risks and benefits of the procedure  Risks including but not limited to bleeding, perforation, infection, and missed lesion  PREPARATION:   Telemetry, pulse oximetry, blood pressure were monitored throughout the procedure  Patient was identified by myself both verbally and by visual inspection of ID band  DESCRIPTION:   Patient was placed in the left lateral decubitus position and was sedated with the above medication  The gastroscope was introduced in to the oropharynx and the esophagus was intubated under direct visualization  Scope was passed down the esophagus up to 2nd part of the duodenum   A careful inspection was made as the gastroscope was withdrawn, including a retroflexed view of the stomach; findings and interventions are described below  FINDINGS:    #1  Esophagus- white dots in the middle of the esophagus, biopsied with cold biopsy forceps    #2  Stomach- gastritis in the body of the stomach, biopsied with cold biopsy forceps    #3  Duodenum- normal 1st and 2nd part of the duodenum, biopsied with cold biopsy forceps in 2 separate jars in the bulb and 2nd part of the duodenum         IMPRESSIONS:      Gastritis  Biopsied  White dots in the esophagus  Biopsied  Normal duodenum  Biopsied  RECOMMENDATIONS:     Await pathology results  Proceed with colonoscopy  COMPLICATIONS:  None; patient tolerated the procedure well  DISPOSITION: PACU           CONDITION: Stable      Colonoscopy Procedure Note    Procedure: Colonoscopy    Sedation: Monitored anesthesia care, check anesthesia records      ASA Class: 2    INDICATIONS:  Rectal bleeding    POST-OP DIAGNOSIS: See the impression below    Procedure Details     Prior colonoscopy: No prior colonoscopy  Informed consent was obtained for the procedure, including sedation  Risks of perforation, hemorrhage, adverse drug reaction and aspiration were discussed  The patient was placed in the left lateral decubitus position  Based on the pre-procedure assessment, including review of the patient's medical history, medications, allergies, and review of systems, he had been deemed to be an appropriate candidate for conscious sedation; he was therefore sedated with the medications listed below  The patient was monitored continuously with telemetry, pulse oximetry, blood pressure monitoring, and direct observations  A rectal examination was performed  The colonoscope was inserted into the rectum and advanced under direct vision to the cecum, which was identified by the ileocecal valve and appendiceal orifice    The quality of the colonic preparation was good   A careful inspection was made as the colonoscope was withdrawn, including a retroflexed view of the rectum; findings and interventions are described below  Findings:  1  Pedunculated polyp at least 10 mm in distal sigmoid removed by hot snare polypectomy  2  Pedunculated 10 mm rectal polyp removed by hot snare polypectomy  3  Sessile flat polyp 5 mm polyp in the rectum removed by cold snare polypectomy  4  Small internal hemorrhoids           Complications: None; patient tolerated the procedure well  Impression:    1  Pedunculated polyp 10 mm in distal sigmoid removed by hot snare polypectomy  2  Pedunculated 10 mm rectal polyp removed by hot snare polypectomy  3  Sessile flat polyp 5 mm polyp in the rectum removed by cold snare polypectomy  4  Small internal hemorrhoids             Recommendations: Follow-up biopsies   Repeat colonoscopy in 3 years or sooner if clinically indicated  Repeat colonoscopy is being recommended at an interval of less than 10 years, this is because of a personal history of polyps or colon cancer               SIGNATURE: Britany Mancuso DO  DATE: October 4, 2018  TIME: 9:06 AM

## 2018-10-18 ENCOUNTER — TELEPHONE (OUTPATIENT)
Dept: GASTROENTEROLOGY | Facility: MEDICAL CENTER | Age: 43
End: 2018-10-18

## 2018-10-18 NOTE — TELEPHONE ENCOUNTER
----- Message from Ghada Sesay [facility] sent at 10/18/2018 11:02 AM EDT -----  Regarding: Can you please call this patient for me?      ----- Message -----  From: Pato Beltran DO  Sent: 10/17/2018   1:21 PM  To: [facility]    Please let patient know polyps were precancerous, based on this I recommend a repeat colonoscopy in 3 years, please place recall

## 2019-01-17 NOTE — ANESTHESIA PREPROCEDURE EVALUATION
Patient did view [employer] message.  He did labs today.  Will postpone for a day to see if results are completed.  Liz LANE RN     Review of Systems/Medical History          Cardiovascular  Hypertension controlled,    Pulmonary  Smoker cigarette smoker  Cumulative Pack Years: 8,        GI/Hepatic    GI bleeding , history of, GERD well controlled,             Endo/Other     GYN       Hematology   Musculoskeletal       Neurology   Psychology           Physical Exam    Airway    Mallampati score: I  TM Distance: >3 FB  Neck ROM: full     Dental       Cardiovascular  Rhythm: regular, Rate: normal,     Pulmonary  Breath sounds clear to auscultation,     Other Findings        Anesthesia Plan  ASA Score- 2     Anesthesia Type- IV sedation with anesthesia with ASA Monitors  Additional Monitors:   Airway Plan:         Plan Factors- Patient instructed to abstain from smoking on day of procedure  Patient did not smoke on day of surgery  Induction- intravenous  Postoperative Plan-     Informed Consent- Anesthetic plan and risks discussed with patient  I personally reviewed this patient with the CRNA  Discussed and agreed on the Anesthesia Plan with the CRNA  David Watkins

## 2019-02-21 RX ORDER — LISINOPRIL 10 MG/1
5 TABLET ORAL
COMMUNITY

## 2019-02-21 RX ORDER — TOPIRAMATE 50 MG/1
50 TABLET, FILM COATED ORAL
COMMUNITY
End: 2019-12-17 | Stop reason: CLARIF

## 2019-02-21 RX ORDER — NAPROXEN SODIUM 550 MG/1
550 TABLET ORAL
COMMUNITY
Start: 2015-11-17 | End: 2019-12-17 | Stop reason: CLARIF

## 2019-04-13 ENCOUNTER — TRANSCRIBE ORDERS (OUTPATIENT)
Dept: LAB | Facility: HOSPITAL | Age: 44
End: 2019-04-13

## 2019-04-13 ENCOUNTER — HOSPITAL ENCOUNTER (OUTPATIENT)
Dept: RADIOLOGY | Facility: HOSPITAL | Age: 44
Discharge: HOME | End: 2019-04-13
Payer: COMMERCIAL

## 2019-04-13 ENCOUNTER — APPOINTMENT (OUTPATIENT)
Dept: LAB | Facility: HOSPITAL | Age: 44
End: 2019-04-13
Payer: COMMERCIAL

## 2019-04-13 DIAGNOSIS — R52 PAIN: ICD-10-CM

## 2019-04-13 DIAGNOSIS — E78.5 HYPERLIPIDEMIA, UNSPECIFIED HYPERLIPIDEMIA TYPE: Primary | ICD-10-CM

## 2019-04-13 LAB
ALBUMIN SERPL BCP-MCNC: 3.8 G/DL (ref 3.5–5)
ALP SERPL-CCNC: 67 U/L (ref 46–116)
ALT SERPL W P-5'-P-CCNC: 51 U/L (ref 12–78)
ANION GAP SERPL CALCULATED.3IONS-SCNC: 4 MMOL/L (ref 4–13)
AST SERPL W P-5'-P-CCNC: 32 U/L (ref 5–45)
BACTERIA UR QL AUTO: NORMAL /HPF
BILIRUB SERPL-MCNC: 0.4 MG/DL (ref 0.2–1)
BILIRUB UR QL STRIP: NEGATIVE
BUN SERPL-MCNC: 25 MG/DL (ref 5–25)
CALCIUM SERPL-MCNC: 8.6 MG/DL (ref 8.3–10.1)
CHLORIDE SERPL-SCNC: 105 MMOL/L (ref 100–108)
CHOLEST SERPL-MCNC: 251 MG/DL (ref 50–200)
CLARITY UR: CLEAR
CO2 SERPL-SCNC: 28 MMOL/L (ref 21–32)
COLOR UR: YELLOW
CREAT SERPL-MCNC: 0.98 MG/DL (ref 0.6–1.3)
ERYTHROCYTE [DISTWIDTH] IN BLOOD BY AUTOMATED COUNT: 13.7 % (ref 11.6–15.1)
GFR SERPL CREATININE-BSD FRML MDRD: 95 ML/MIN/1.73SQ M
GLUCOSE P FAST SERPL-MCNC: 99 MG/DL (ref 65–99)
GLUCOSE UR STRIP-MCNC: NEGATIVE MG/DL
HCT VFR BLD AUTO: 46.5 % (ref 36.5–49.3)
HDLC SERPL-MCNC: 39 MG/DL (ref 40–60)
HGB BLD-MCNC: 15.4 G/DL (ref 12–17)
HGB UR QL STRIP.AUTO: ABNORMAL
HYALINE CASTS #/AREA URNS LPF: NORMAL /LPF
KETONES UR STRIP-MCNC: NEGATIVE MG/DL
LDLC SERPL CALC-MCNC: 168 MG/DL (ref 0–100)
LEUKOCYTE ESTERASE UR QL STRIP: NEGATIVE
MCH RBC QN AUTO: 29.8 PG (ref 26.8–34.3)
MCHC RBC AUTO-ENTMCNC: 33.1 G/DL (ref 31.4–37.4)
MCV RBC AUTO: 90 FL (ref 82–98)
NITRITE UR QL STRIP: NEGATIVE
NON-SQ EPI CELLS URNS QL MICRO: NORMAL /HPF
NONHDLC SERPL-MCNC: 212 MG/DL
PH UR STRIP.AUTO: 5 [PH]
PLATELET # BLD AUTO: 292 THOUSANDS/UL (ref 149–390)
PMV BLD AUTO: 10.4 FL (ref 8.9–12.7)
POTASSIUM SERPL-SCNC: 4.4 MMOL/L (ref 3.5–5.3)
PROT SERPL-MCNC: 7.7 G/DL (ref 6.4–8.2)
PROT UR STRIP-MCNC: ABNORMAL MG/DL
RBC # BLD AUTO: 5.16 MILLION/UL (ref 3.88–5.62)
RBC #/AREA URNS AUTO: NORMAL /HPF
SODIUM SERPL-SCNC: 137 MMOL/L (ref 136–145)
SP GR UR STRIP.AUTO: 1.02 (ref 1–1.03)
TRIGL SERPL-MCNC: 218 MG/DL
TSH SERPL DL<=0.05 MIU/L-ACNC: 1.61 UIU/ML (ref 0.36–3.74)
UROBILINOGEN UR QL STRIP.AUTO: 0.2 E.U./DL
WBC # BLD AUTO: 9.89 THOUSAND/UL (ref 4.31–10.16)
WBC #/AREA URNS AUTO: NORMAL /HPF

## 2019-04-13 PROCEDURE — 72110 X-RAY EXAM L-2 SPINE 4/>VWS: CPT

## 2019-04-13 PROCEDURE — 84443 ASSAY THYROID STIM HORMONE: CPT

## 2019-04-13 PROCEDURE — 71046 X-RAY EXAM CHEST 2 VIEWS: CPT

## 2019-04-13 PROCEDURE — 81001 URINALYSIS AUTO W/SCOPE: CPT

## 2019-04-13 PROCEDURE — 74019 RADEX ABDOMEN 2 VIEWS: CPT

## 2019-04-13 PROCEDURE — 85027 COMPLETE CBC AUTOMATED: CPT

## 2019-04-13 PROCEDURE — 36415 COLL VENOUS BLD VENIPUNCTURE: CPT

## 2019-04-13 PROCEDURE — 80061 LIPID PANEL: CPT

## 2019-04-13 PROCEDURE — 80053 COMPREHEN METABOLIC PANEL: CPT

## 2019-04-13 PROCEDURE — 72040 X-RAY EXAM NECK SPINE 2-3 VW: CPT

## 2019-10-22 ENCOUNTER — TRANSCRIBE ORDERS (OUTPATIENT)
Dept: ADMINISTRATIVE | Facility: HOSPITAL | Age: 44
End: 2019-10-22

## 2019-10-22 DIAGNOSIS — G47.30 SLEEP APNEA, UNSPECIFIED TYPE: Primary | ICD-10-CM

## 2019-12-07 ENCOUNTER — TRANSCRIBE ORDERS (OUTPATIENT)
Dept: LAB | Facility: HOSPITAL | Age: 44
End: 2019-12-07

## 2019-12-07 ENCOUNTER — APPOINTMENT (OUTPATIENT)
Dept: LAB | Facility: HOSPITAL | Age: 44
End: 2019-12-07
Payer: COMMERCIAL

## 2019-12-07 DIAGNOSIS — Z13.1 SCREENING FOR DIABETES MELLITUS: ICD-10-CM

## 2019-12-07 DIAGNOSIS — I10 ESSENTIAL HYPERTENSION, MALIGNANT: Primary | ICD-10-CM

## 2019-12-07 DIAGNOSIS — I10 ESSENTIAL HYPERTENSION, MALIGNANT: ICD-10-CM

## 2019-12-07 LAB
ALBUMIN SERPL BCP-MCNC: 3.8 G/DL (ref 3.5–5)
ALP SERPL-CCNC: 63 U/L (ref 46–116)
ALT SERPL W P-5'-P-CCNC: 54 U/L (ref 12–78)
ANION GAP SERPL CALCULATED.3IONS-SCNC: 4 MMOL/L (ref 4–13)
AST SERPL W P-5'-P-CCNC: 27 U/L (ref 5–45)
BACTERIA UR QL AUTO: NORMAL /HPF
BASOPHILS # BLD AUTO: 0.07 THOUSANDS/ΜL (ref 0–0.1)
BASOPHILS NFR BLD AUTO: 1 % (ref 0–1)
BILIRUB SERPL-MCNC: 0.34 MG/DL (ref 0.2–1)
BILIRUB UR QL STRIP: NEGATIVE
BUN SERPL-MCNC: 16 MG/DL (ref 5–25)
CALCIUM SERPL-MCNC: 9.2 MG/DL (ref 8.3–10.1)
CHLORIDE SERPL-SCNC: 106 MMOL/L (ref 100–108)
CHOLEST SERPL-MCNC: 228 MG/DL (ref 50–200)
CLARITY UR: CLEAR
CO2 SERPL-SCNC: 29 MMOL/L (ref 21–32)
COLOR UR: YELLOW
CREAT SERPL-MCNC: 0.94 MG/DL (ref 0.6–1.3)
EOSINOPHIL # BLD AUTO: 0.37 THOUSAND/ΜL (ref 0–0.61)
EOSINOPHIL NFR BLD AUTO: 4 % (ref 0–6)
ERYTHROCYTE [DISTWIDTH] IN BLOOD BY AUTOMATED COUNT: 13.7 % (ref 11.6–15.1)
EST. AVERAGE GLUCOSE BLD GHB EST-MCNC: 120 MG/DL
GFR SERPL CREATININE-BSD FRML MDRD: 100 ML/MIN/1.73SQ M
GLUCOSE P FAST SERPL-MCNC: 97 MG/DL (ref 65–99)
GLUCOSE UR STRIP-MCNC: NEGATIVE MG/DL
HBA1C MFR BLD: 5.8 % (ref 4.2–6.3)
HCT VFR BLD AUTO: 46.4 % (ref 36.5–49.3)
HDLC SERPL-MCNC: 40 MG/DL
HGB BLD-MCNC: 15.3 G/DL (ref 12–17)
HGB UR QL STRIP.AUTO: NEGATIVE
HYALINE CASTS #/AREA URNS LPF: NORMAL /LPF
IMM GRANULOCYTES # BLD AUTO: 0.04 THOUSAND/UL (ref 0–0.2)
IMM GRANULOCYTES NFR BLD AUTO: 0 % (ref 0–2)
KETONES UR STRIP-MCNC: NEGATIVE MG/DL
LDLC SERPL CALC-MCNC: 157 MG/DL (ref 0–100)
LEUKOCYTE ESTERASE UR QL STRIP: NEGATIVE
LYMPHOCYTES # BLD AUTO: 2.93 THOUSANDS/ΜL (ref 0.6–4.47)
LYMPHOCYTES NFR BLD AUTO: 29 % (ref 14–44)
MCH RBC QN AUTO: 29.6 PG (ref 26.8–34.3)
MCHC RBC AUTO-ENTMCNC: 33 G/DL (ref 31.4–37.4)
MCV RBC AUTO: 90 FL (ref 82–98)
MONOCYTES # BLD AUTO: 0.88 THOUSAND/ΜL (ref 0.17–1.22)
MONOCYTES NFR BLD AUTO: 9 % (ref 4–12)
NEUTROPHILS # BLD AUTO: 5.8 THOUSANDS/ΜL (ref 1.85–7.62)
NEUTS SEG NFR BLD AUTO: 57 % (ref 43–75)
NITRITE UR QL STRIP: NEGATIVE
NON-SQ EPI CELLS URNS QL MICRO: NORMAL /HPF
NONHDLC SERPL-MCNC: 188 MG/DL
NRBC BLD AUTO-RTO: 0 /100 WBCS
PH UR STRIP.AUTO: 5.5 [PH]
PLATELET # BLD AUTO: 289 THOUSANDS/UL (ref 149–390)
PMV BLD AUTO: 10.2 FL (ref 8.9–12.7)
POTASSIUM SERPL-SCNC: 4.4 MMOL/L (ref 3.5–5.3)
PROT SERPL-MCNC: 7.5 G/DL (ref 6.4–8.2)
PROT UR STRIP-MCNC: NEGATIVE MG/DL
RBC # BLD AUTO: 5.17 MILLION/UL (ref 3.88–5.62)
RBC #/AREA URNS AUTO: NORMAL /HPF
SODIUM SERPL-SCNC: 139 MMOL/L (ref 136–145)
SP GR UR STRIP.AUTO: 1.02 (ref 1–1.03)
TRIGL SERPL-MCNC: 157 MG/DL
TSH SERPL DL<=0.05 MIU/L-ACNC: 0.99 UIU/ML (ref 0.36–3.74)
UROBILINOGEN UR QL STRIP.AUTO: 0.2 E.U./DL
WBC # BLD AUTO: 10.09 THOUSAND/UL (ref 4.31–10.16)
WBC #/AREA URNS AUTO: NORMAL /HPF

## 2019-12-07 PROCEDURE — 85025 COMPLETE CBC W/AUTO DIFF WBC: CPT

## 2019-12-07 PROCEDURE — 83036 HEMOGLOBIN GLYCOSYLATED A1C: CPT

## 2019-12-07 PROCEDURE — 84443 ASSAY THYROID STIM HORMONE: CPT

## 2019-12-07 PROCEDURE — 36415 COLL VENOUS BLD VENIPUNCTURE: CPT

## 2019-12-07 PROCEDURE — 80053 COMPREHEN METABOLIC PANEL: CPT

## 2019-12-07 PROCEDURE — 80061 LIPID PANEL: CPT

## 2019-12-07 PROCEDURE — 81001 URINALYSIS AUTO W/SCOPE: CPT

## 2019-12-16 ENCOUNTER — TELEPHONE (OUTPATIENT)
Dept: SLEEP CENTER | Facility: CLINIC | Age: 44
End: 2019-12-16

## 2019-12-17 ENCOUNTER — HOSPITAL ENCOUNTER (OUTPATIENT)
Facility: HOSPITAL | Age: 44
Setting detail: OBSERVATION
Discharge: HOME | End: 2019-12-18
Attending: EMERGENCY MEDICINE | Admitting: INTERNAL MEDICINE
Payer: COMMERCIAL

## 2019-12-17 ENCOUNTER — APPOINTMENT (EMERGENCY)
Dept: RADIOLOGY | Facility: HOSPITAL | Age: 44
End: 2019-12-17
Payer: COMMERCIAL

## 2019-12-17 DIAGNOSIS — R07.9 CHEST PAIN: Primary | ICD-10-CM

## 2019-12-17 DIAGNOSIS — J06.9 URI (UPPER RESPIRATORY INFECTION): ICD-10-CM

## 2019-12-17 DIAGNOSIS — J30.9 ALLERGIC RHINITIS: ICD-10-CM

## 2019-12-17 DIAGNOSIS — R55 NEAR SYNCOPE: ICD-10-CM

## 2019-12-17 LAB
ALBUMIN SERPL BCP-MCNC: 3.5 G/DL (ref 3.5–5)
ALP SERPL-CCNC: 77 U/L (ref 46–116)
ALT SERPL W P-5'-P-CCNC: 61 U/L (ref 12–78)
ANION GAP SERPL CALCULATED.3IONS-SCNC: 6 MMOL/L (ref 4–13)
AST SERPL W P-5'-P-CCNC: 33 U/L (ref 5–45)
BASOPHILS # BLD AUTO: 0.06 THOUSANDS/ΜL (ref 0–0.1)
BASOPHILS NFR BLD AUTO: 1 % (ref 0–1)
BILIRUB SERPL-MCNC: 0.22 MG/DL (ref 0.2–1)
BUN SERPL-MCNC: 19 MG/DL (ref 5–25)
CALCIUM SERPL-MCNC: 8.8 MG/DL (ref 8.3–10.1)
CHLORIDE SERPL-SCNC: 108 MMOL/L (ref 100–108)
CO2 SERPL-SCNC: 24 MMOL/L (ref 21–32)
CREAT SERPL-MCNC: 1.06 MG/DL (ref 0.6–1.3)
D DIMER PPP FEU-MCNC: 0.37 UG/ML FEU
EOSINOPHIL # BLD AUTO: 0.15 THOUSAND/ΜL (ref 0–0.61)
EOSINOPHIL NFR BLD AUTO: 2 % (ref 0–6)
ERYTHROCYTE [DISTWIDTH] IN BLOOD BY AUTOMATED COUNT: 13.7 % (ref 11.6–15.1)
GFR SERPL CREATININE-BSD FRML MDRD: 86 ML/MIN/1.73SQ M
GLUCOSE SERPL-MCNC: 102 MG/DL (ref 65–140)
HCT VFR BLD AUTO: 45.1 % (ref 36.5–49.3)
HGB BLD-MCNC: 15.3 G/DL (ref 12–17)
IMM GRANULOCYTES # BLD AUTO: 0.03 THOUSAND/UL (ref 0–0.2)
IMM GRANULOCYTES NFR BLD AUTO: 0 % (ref 0–2)
LYMPHOCYTES # BLD AUTO: 1.41 THOUSANDS/ΜL (ref 0.6–4.47)
LYMPHOCYTES NFR BLD AUTO: 18 % (ref 14–44)
MCH RBC QN AUTO: 30.3 PG (ref 26.8–34.3)
MCHC RBC AUTO-ENTMCNC: 33.9 G/DL (ref 31.4–37.4)
MCV RBC AUTO: 89 FL (ref 82–98)
MONOCYTES # BLD AUTO: 1.21 THOUSAND/ΜL (ref 0.17–1.22)
MONOCYTES NFR BLD AUTO: 16 % (ref 4–12)
NEUTROPHILS # BLD AUTO: 4.96 THOUSANDS/ΜL (ref 1.85–7.62)
NEUTS SEG NFR BLD AUTO: 63 % (ref 43–75)
NRBC BLD AUTO-RTO: 0 /100 WBCS
PLATELET # BLD AUTO: 248 THOUSANDS/UL (ref 149–390)
PLATELET # BLD AUTO: 279 THOUSANDS/UL (ref 149–390)
PMV BLD AUTO: 10 FL (ref 8.9–12.7)
PMV BLD AUTO: 10 FL (ref 8.9–12.7)
POTASSIUM SERPL-SCNC: 4.3 MMOL/L (ref 3.5–5.3)
PROT SERPL-MCNC: 7.8 G/DL (ref 6.4–8.2)
RBC # BLD AUTO: 5.05 MILLION/UL (ref 3.88–5.62)
SODIUM SERPL-SCNC: 138 MMOL/L (ref 136–145)
TROPONIN I SERPL-MCNC: <0.02 NG/ML
TROPONIN I SERPL-MCNC: <0.02 NG/ML
WBC # BLD AUTO: 7.82 THOUSAND/UL (ref 4.31–10.16)

## 2019-12-17 PROCEDURE — 71046 X-RAY EXAM CHEST 2 VIEWS: CPT

## 2019-12-17 PROCEDURE — 93005 ELECTROCARDIOGRAM TRACING: CPT

## 2019-12-17 PROCEDURE — 85025 COMPLETE CBC W/AUTO DIFF WBC: CPT | Performed by: EMERGENCY MEDICINE

## 2019-12-17 PROCEDURE — NC001 PR NO CHARGE: Performed by: INTERNAL MEDICINE

## 2019-12-17 PROCEDURE — 84484 ASSAY OF TROPONIN QUANT: CPT | Performed by: INTERNAL MEDICINE

## 2019-12-17 PROCEDURE — 99285 EMERGENCY DEPT VISIT HI MDM: CPT

## 2019-12-17 PROCEDURE — 80053 COMPREHEN METABOLIC PANEL: CPT | Performed by: EMERGENCY MEDICINE

## 2019-12-17 PROCEDURE — 85049 AUTOMATED PLATELET COUNT: CPT | Performed by: INTERNAL MEDICINE

## 2019-12-17 PROCEDURE — 36415 COLL VENOUS BLD VENIPUNCTURE: CPT | Performed by: EMERGENCY MEDICINE

## 2019-12-17 PROCEDURE — 85379 FIBRIN DEGRADATION QUANT: CPT | Performed by: EMERGENCY MEDICINE

## 2019-12-17 PROCEDURE — 99285 EMERGENCY DEPT VISIT HI MDM: CPT | Performed by: EMERGENCY MEDICINE

## 2019-12-17 PROCEDURE — 84484 ASSAY OF TROPONIN QUANT: CPT | Performed by: EMERGENCY MEDICINE

## 2019-12-17 RX ORDER — LISINOPRIL 5 MG/1
5 TABLET ORAL DAILY
Status: DISCONTINUED | OUTPATIENT
Start: 2019-12-18 | End: 2019-12-18 | Stop reason: HOSPADM

## 2019-12-17 RX ORDER — OMEPRAZOLE 20 MG/1
40 CAPSULE, DELAYED RELEASE ORAL DAILY
COMMUNITY

## 2019-12-17 RX ORDER — PANTOPRAZOLE SODIUM 40 MG/1
40 TABLET, DELAYED RELEASE ORAL
Status: DISCONTINUED | OUTPATIENT
Start: 2019-12-18 | End: 2019-12-18 | Stop reason: HOSPADM

## 2019-12-17 NOTE — ED ATTENDING ATTESTATION
12/17/2019  I, Kenyatta Orosco MD, saw and evaluated the patient  I have discussed the patient with the resident/non-physician practitioner and agree with the resident's/non-physician practitioner's findings, Plan of Care, and MDM as documented in the resident's/non-physician practitioner's note, except where noted  All available labs and Radiology studies were reviewed  I was present for key portions of any procedure(s) performed by the resident/non-physician practitioner and I was immediately available to provide assistance  At this point I agree with the current assessment done in the Emergency Department  I have conducted an independent evaluation of this patient a history and physical is as follows:    ED Course     Patient presents for evaluation due to episode chest pain, palpitations, and lightheadedness 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

## 2019-12-17 NOTE — H&P
INTERNAL MEDICINE RESIDENCY ADMISSION H&P     Name: Jose Springer   Age & Sex: 45 y o  male   MRN: [id number]  Unit/Bed#: ED 23   Encounter: [id number]  Primary Care Provider: Ashkan Chavez PA-C    Code Status: Level 1 - Full Code  Admission Status: OBSERVATION  Disposition: Patient requires Med/Surg with Telemetry    ASSESSMENT/PLAN     Active Problems:    Benign essential hypertension    Chest pain      Chest pain  Assessment & Plan  Has been experiencing exertional chest pain for the past 2-3 weeks  Today felt pain at rest that lasted 25 mins accompanied by diaphoresis and palpitations  The pain had resolved by the time he got to the ED without intervention  Initial troponin was negative  EKG showed sinus tachycardia  Admitted for ACS rule out with heart score 4  - Trend troponin x3  - monitor on telemetry      Benign essential hypertension  Assessment & Plan  Chronic  Stable  BP well controlled on admission    - continue home lisinopril 5mg daily   - monitor BP      VTE Pharmacologic Prophylaxis: Enoxaparin (Lovenox)  VTE Mechanical Prophylaxis: sequential compression device    CHIEF COMPLAINT     Chief Complaint   Patient presents with    Dizziness     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, palpitations and dizziness  HISTORY OF PRESENT ILLNESS     Mr Jackie Richmond is a 44 yo male with a past medial history significant for HTN and tobacco use who presents after having an episode of chest discomfort, diaphoresis, and 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  The episode lasted 25 mins  He points to his sternal notch as the location of the pain  This was not accompanied by nausea and was non radiating  The pain went on its own without intervention  He is not currently feeling any chest pain  For the past 2-3 weeks he has been feeling chest pain and tightness  They had all previously occurred in the setting of exertion    He also endorsed SOB when climbing stairs  He is a current everyday smoker for the past 18-19 years  He smoke 4-5 cigarettes daily  He drinks only socially  He denies recreational drug use  His father  at the age of 58 from his heart attack  He was scheduled to have a sleep study tomorrow but does not currently carry a diagnosis of sleep apnea  In the ED, his vitals were significant for tachycardia  Troponin was negative  BMP and CBC were all within normal limits  Chest Xray showed no acute cardiopulmonary disease  EKG showed sinus tachycardia  He was found to have a heart score of 4 secondary to history and risk factors  He was admitted for ACS rule out  REVIEW OF SYSTEMS     Review of Systems   Constitutional: Positive for diaphoresis  Respiratory: Positive for chest tightness and shortness of breath  Cardiovascular: Positive for chest pain and palpitations  Negative for leg swelling  Gastrointestinal: Positive for nausea  Negative for vomiting  OBJECTIVE     Vitals:    19 1501 19 1623   BP: 126/88 108/81   BP Location: Right arm Right arm   Pulse: (!) 120 104   Resp: 18 18   Temp: 98 1 °F (36 7 °C)    TempSrc: Oral    SpO2: 98% 99%   Weight: 111 kg (244 lb 11 4 oz)    Height: 5' 4" (1 626 m)       Temperature:   Temp (24hrs), Av 1 °F (36 7 °C), Min:98 1 °F (36 7 °C), Max:98 1 °F (36 7 °C)    Temperature: 98 1 °F (36 7 °C)  Intake & Output:  I/O     None        Weights:   IBW: 59 2 kg    Body mass index is 42 kg/m²  Weight (last 2 days)     Date/Time   Weight    19 1501   111 (244 71)            Physical Exam   Constitutional: He is oriented to person, place, and time  He appears well-developed and well-nourished  No distress  HENT:   Head: Normocephalic and atraumatic  Cardiovascular: Regular rhythm and normal heart sounds  Exam reveals no friction rub  No murmur heard  tachycardic   Pulmonary/Chest: Effort normal and breath sounds normal  No stridor   No respiratory distress  He has no wheezes  Abdominal: Soft  Bowel sounds are normal  He exhibits no distension and no mass  There is no tenderness  There is no guarding  Musculoskeletal: Normal range of motion  He exhibits no edema or deformity  Neurological: He is alert and oriented to person, place, and time  Skin: Skin is warm and dry  He is not diaphoretic  Vitals reviewed  PAST MEDICAL HISTORY     Past Medical History:   Diagnosis Date    Anxiety     mild    Back pain, thoracic     Last Assessed [id number]    Degeneration of cervical intervertebral disc     Last Assessed [id number]    Depression     Hypertension     Myofascial pain syndrome     Last Assessed [id number]    Neck pain     Last Assessed [id number]    Trauma     Rectum    Upper back pain     Last Assessed [id number]     PAST SURGICAL HISTORY     Past Surgical History:   Procedure Laterality Date    CARPAL TUNNEL RELEASE      [place] ESOPHAGOGASTRODUODENOSCOPY TRANSORAL DIAGNOSTIC N/A 10/4/2018    Procedure: EGD AND COLONOSCOPY;  Surgeon: Romana Barth, DO;  Location: [facility]; Service: Gastroenterology     SOCIAL & FAMILY HISTORY     Social History     Substance and Sexual Activity   Alcohol Use Yes    Comment: Socially     Substance and Sexual Activity   Alcohol Use Yes    Comment: Socially        Substance and Sexual Activity   Drug Use No     Social History     Tobacco Use   Smoking Status Current Every Day Smoker    Packs/day: 0 20    Years: 20 00    Pack years: 4 00    Types: Cigarettes   Smokeless Tobacco Never Used   Tobacco Comment    less than 1 pack a day     Family History   Problem Relation Age of Onset    Diabetes Family     Hypertension Family     Other Family         Back Problems    Breast cancer Maternal Grandmother     Breast cancer Paternal Aunt      LABORATORY DATA     Labs: I have personally reviewed pertinent reports      Results from last 7 days   Lab Units 12/17/19  1558   WBC Thousand/uL 7 82   HEMOGLOBIN g/dL 15 3   HEMATOCRIT % 45 1   PLATELETS Thousands/uL 279   NEUTROS PCT % 63   MONOS PCT % 16*      Results from last 7 days   Lab Units 12/17/19  1558   POTASSIUM mmol/L 4 3   CHLORIDE mmol/L 108   CO2 mmol/L 24   BUN mg/dL 19   CREATININE mg/dL 1 06   CALCIUM mg/dL 8 8   ALK PHOS U/L 77   ALT U/L 61   AST U/L 33                      Results from last 7 days   Lab Units 12/17/19  1558   TROPONIN I ng/mL <0 02     Micro:  No results found for: Guy Errol, WOUNDCULT, SPUTUMCULTUR  IMAGING & DIAGNOSTIC TESTS     Imaging: I have personally reviewed pertinent reports  Xr Chest 2 Views    Result Date: 12/17/2019  Impression: No acute cardiopulmonary disease  Workstation performed: [id number]     EKG, Pathology, and Other Studies: I have personally reviewed pertinent reports  ALLERGIES     Allergies   Allergen Reactions    Dust Mite Extract     Morphine     Penicillins     Pollen Extract      MEDICATIONS PRIOR TO ARRIVAL     Prior to Admission medications    Medication Sig Start Date End Date Taking?  Authorizing Provider   lisinopril (ZESTRIL) 10 mg tablet Take 5 mg by mouth    Yes Historical Provider, MD   omeprazole (PriLOSEC) 20 mg delayed release capsule Take 20 mg by mouth daily   Yes Historical Provider, MD   HYDROcodone-acetaminophen (NORCO) 5-325 mg per tablet Take 1-2 tablets by mouth every 6 (six) hours as needed 1/12/16   Historical Provider, MD   ibuprofen (MOTRIN) 600 mg tablet Take 600 mg by mouth every 8 (eight) hours as needed 8/5/15   Historical Provider, MD   naproxen sodium (ANAPROX) 550 mg tablet Take 550 mg by mouth 11/17/15   Historical Provider, MD   topiramate (TOPAMAX) 50 MG tablet Take 50 mg by mouth    Historical Provider, MD   traMADol (ULTRAM) 50 mg tablet Take 50 mg by mouth every 6 (six) hours as needed    Historical Provider, MD   lisinopril (ZESTRIL) 5 mg tablet Take 1 tablet (5 mg total) by mouth daily for 180 days 7/10/18 12/17/19  Eladia Godinez PA-C     MEDICATIONS ADMINISTERED IN LAST 24 HOURS     CURRENT MEDICATIONS            Admission Time  I spent 30 minutes admitting the patient  This involved direct patient contact where I performed a full history and physical, reviewing previous records, and reviewing laboratory and other diagnostic studies  Portions of the record may have been created with voice recognition software  Occasional wrong word or "sound a like" substitutions may have occurred due to the inherent limitations of voice recognition software    Read the chart carefully and recognize, using context, where substitutions have occurred     ==  Valentine Clayton MD  [address]  Internal Medicine Residency PGY-1

## 2019-12-17 NOTE — ASSESSMENT & PLAN NOTE
Chronic  Stable   BP well controlled on admission    - continue home lisinopril 5mg daily   - monitor BP

## 2019-12-17 NOTE — ASSESSMENT & PLAN NOTE
Has been experiencing exertional chest pain for the past 2-3 weeks  Today felt pain at rest that lasted 25 mins accompanied by diaphoresis and palpitations  The pain had resolved by the time he got to the ED without intervention  Initial troponin was negative  EKG showed sinus tachycardia    Admitted for ACS rule out with heart score 4  - Trend troponin x3  - monitor on telemetry

## 2019-12-17 NOTE — ED PROVIDER NOTES
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, palpitations and dizziness  HPI  Patient is a 43-year-old male history of hypertension, hyperlipidemia, everyday smoker presenting for evaluation of near syncope, chest tightness, diaphoresis  Patient states that symptoms were sudden onset while he was riding on the bus, associated with palpitations  Patient states that symptoms lasted for roughly 30 minutes and then resolved  Patient states associated nausea but no vomiting  Patient states that he has had several similar episodes over the course of the last few weeks but states that this was the worst   Patient additionally states that for the last 3 months he has been having worsening dyspnea on exertion which he notices when he walks up steps at home  Patient denies fevers, chills, constitutional symptoms  Patient denies prior cardiac history  Prior to Admission Medications   Prescriptions Last Dose Informant Patient Reported?  Taking?   lisinopril (ZESTRIL) 10 mg tablet 12/17/2019 at Unknown time  Yes Yes   Sig: Take 5 mg by mouth    omeprazole (PriLOSEC) 20 mg delayed release capsule   No No   Sig: Take 1 capsule (20 mg total) by mouth daily for 30 days   omeprazole (PriLOSEC) 20 mg delayed release capsule 12/17/2019 at Unknown time Self Yes Yes   Sig: Take 40 mg by mouth daily       Facility-Administered Medications: None       Past Medical History:   Diagnosis Date    Anxiety     mild    Back pain, thoracic     Last Assessed [id number]    Degeneration of cervical intervertebral disc     Last Assessed [id number]    Depression     Hypertension     Myofascial pain syndrome     Last Assessed [id number]    Neck pain     Last Assessed [id number]    Trauma     Rectum    Upper back pain     Last Assessed [id number]       Past Surgical History:   Procedure Laterality Date    CARPAL TUNNEL RELEASE      WI ESOPHAGOGASTRODUODENOSCOPY TRANSORAL DIAGNOSTIC N/A 10/4/2018    Procedure: EGD AND COLONOSCOPY;  Surgeon: Ayla Farris DO;  Location: [facility]; Service: Gastroenterology       Family History   Problem Relation Age of Onset    Diabetes Family     Hypertension Family     Other Family         Back Problems    Breast cancer Maternal Grandmother     Breast cancer Paternal Aunt      I have reviewed and agree with the history as documented  Social History     Tobacco Use    Smoking status: Current Every Day Smoker     Packs/day: 0 20     Years: 20 00     Pack years: 4 00     Types: Cigarettes    Smokeless tobacco: Never Used    Tobacco comment: less than 1 pack a day   Substance Use Topics    Alcohol use: Yes     Comment: Socially    Drug use: No        Review of Systems   Constitutional: Negative for chills and fever  HENT: Negative for sore throat  Eyes: Negative for photophobia and visual disturbance  Respiratory: Positive for chest tightness and shortness of breath  Negative for cough and wheezing  Cardiovascular: Positive for palpitations  Negative for chest pain and leg swelling  Gastrointestinal: Negative for abdominal distention, abdominal pain, constipation, diarrhea, nausea and vomiting  Genitourinary: Negative for difficulty urinating, dysuria, flank pain and hematuria  Musculoskeletal: Negative for gait problem, joint swelling and myalgias  Skin: Negative for color change, pallor, rash and wound  Neurological: Positive for syncope  Negative for weakness, numbness and headaches  Psychiatric/Behavioral: Negative for confusion         Physical Exam  ED Triage Vitals [12/17/19 1501]   Temperature Pulse Respirations Blood Pressure SpO2   98 1 °F (36 7 °C) (!) 120 18 126/88 98 %      Temp Source Heart Rate Source Patient Position - Orthostatic VS BP Location FiO2 (%)   Oral Monitor Sitting Right arm --      Pain Score       5             Orthostatic Vital Signs  Vitals: 12/17/19 1623 12/17/19 1840 12/17/19 1911 12/17/19 2259   BP: 108/81 120/73 123/88 112/77   Pulse: 104 97 103 (!) 111   Patient Position - Orthostatic VS: Sitting Lying         Physical Exam   Constitutional: He is oriented to person, place, and time  He appears well-developed and well-nourished  No distress  HENT:   Head: Normocephalic and atraumatic  Right Ear: External ear normal    Left Ear: External ear normal    Nose: Nose normal    Mouth/Throat: Oropharynx is clear and moist  No oropharyngeal exudate  Eyes: Pupils are equal, round, and reactive to light  Conjunctivae are normal    Cardiovascular: Normal rate, regular rhythm, normal heart sounds and intact distal pulses  Exam reveals no gallop and no friction rub  No murmur heard  Pulmonary/Chest: Effort normal and breath sounds normal  No respiratory distress  He has no wheezes  He exhibits no tenderness  Abdominal: Soft  Bowel sounds are normal  He exhibits no distension and no mass  There is no tenderness  There is no rebound and no guarding  Musculoskeletal: He exhibits no edema or deformity  Neurological: He is alert and oriented to person, place, and time  Skin: Skin is warm and dry  Capillary refill takes less than 2 seconds  He is not diaphoretic  Psychiatric: He has a normal mood and affect  His behavior is normal    Nursing note and vitals reviewed        ED Medications  Medications   lisinopril (ZESTRIL) tablet 5 mg (has no administration in time range)   pantoprazole (PROTONIX) EC tablet 40 mg (has no administration in time range)   nicotine (NICODERM CQ) 7 mg/24hr TD 24 hr patch 1 patch (has no administration in time range)   enoxaparin (LOVENOX) subcutaneous injection 40 mg (has no administration in time range)       Diagnostic Studies  Results Reviewed     Procedure Component Value Units Date/Time    Troponin I [[id number]]  (Normal) Collected:  12/17/19 1858    Lab Status:  Final result Specimen:  Blood from Arm, Left Updated: 12/17/19 1929     Troponin I <0 02 ng/mL     Platelet count [[id number]]  (Normal) Collected:  12/17/19 1858    Lab Status:  Final result Specimen:  Blood from Arm, Left Updated:  12/17/19 1911     Platelets 639 Thousands/uL      MPV 10 0 fL     D-dimer, quantitative [[id number]]  (Normal) Collected:  12/17/19 1642    Lab Status:  Final result Specimen:  Blood from Arm, Left Updated:  12/17/19 1701     D-Dimer, Quant 0 37 ug/ml FEU     Troponin I [[id number]]  (Normal) Collected:  12/17/19 1558    Lab Status:  Final result Specimen:  Blood from Arm, Left Updated:  12/17/19 1626     Troponin I <0 02 ng/mL     Comprehensive metabolic panel [[id number]] Collected:  12/17/19 1558    Lab Status:  Final result Specimen:  Blood from Arm, Left Updated:  12/17/19 1624     Sodium 138 mmol/L      Potassium 4 3 mmol/L      Chloride 108 mmol/L      CO2 24 mmol/L      ANION GAP 6 mmol/L      BUN 19 mg/dL      Creatinine 1 06 mg/dL      Glucose 102 mg/dL      Calcium 8 8 mg/dL      AST 33 U/L      ALT 61 U/L      Alkaline Phosphatase 77 U/L      Total Protein 7 8 g/dL      Albumin 3 5 g/dL      Total Bilirubin 0 22 mg/dL      eGFR 86 ml/min/1 73sq m     Narrative:       [facility] guidelines for Chronic Kidney Disease (CKD):     Stage 1 with normal or high GFR (GFR > 90 mL/min/1 73 square meters)    Stage 2 Mild CKD (GFR = 60-89 mL/min/1 73 square meters)    Stage 3A Moderate CKD (GFR = 45-59 mL/min/1 73 square meters)    Stage 3B Moderate CKD (GFR = 30-44 mL/min/1 73 square meters)    Stage 4 Severe CKD (GFR = 15-29 mL/min/1 73 square meters)    Stage 5 End Stage CKD (GFR <15 mL/min/1 73 square meters)  Note: GFR calculation is accurate only with a steady state creatinine    CBC and differential [[id number]]  (Abnormal) Collected:  12/17/19 1558    Lab Status:  Final result Specimen:  Blood from Arm, Left Updated:  12/17/19 1612     WBC 7 82 Thousand/uL      RBC 5 05 Million/uL      Hemoglobin 15 3 g/dL Hematocrit 45 1 %      MCV 89 fL      MCH 30 3 pg      MCHC 33 9 g/dL      RDW 13 7 %      MPV 10 0 fL      Platelets 760 Thousands/uL      nRBC 0 /100 WBCs      Neutrophils Relative 63 %      Immat GRANS % 0 %      Lymphocytes Relative 18 %      Monocytes Relative 16 %      Eosinophils Relative 2 %      Basophils Relative 1 %      Neutrophils Absolute 4 96 Thousands/µL      Immature Grans Absolute 0 03 Thousand/uL      Lymphocytes Absolute 1 41 Thousands/µL      Monocytes Absolute 1 21 Thousand/µL      Eosinophils Absolute 0 15 Thousand/µL      Basophils Absolute 0 06 Thousands/µL                  XR chest 2 views   Final Result by Pascual Wong MD (12/17 1654)      No acute cardiopulmonary disease  Workstation performed: [id number]               Procedures  Procedures      ED Course         HEART Risk Score      Most Recent Value   History  2 Filed at: 12/17/2019 1715   ECG  0 Filed at: 12/17/2019 1715   Age  0 Filed at: 12/17/2019 1715   Risk Factors  2 Filed at: 12/17/2019 1715   Troponin  0 Filed at: 12/17/2019 1715   Heart Score Risk Calculator   History  2 Filed at: 12/17/2019 1715   ECG  0 Filed at: 12/17/2019 1715   Age  0 Filed at: 12/17/2019 1715   Risk Factors  2 Filed at: 12/17/2019 1715   Troponin  0 Filed at: 12/17/2019 1715   HEART Score  4 Filed at: 12/17/2019 1715   HEART Score  4 Filed at: 12/17/2019 1715                            MDM  Number of Diagnoses or Management Options  Chest pain:   Near syncope:   Diagnosis management comments: 42-year-old male presenting with near syncope, chest tightness, apparent recent history of unstable angina without any prior outpatient workup  CBC, CMP, troponin, EKG, chest x-ray all checked  EKG demonstrating sinus tachycardia, troponin normal   D-dimer additionally checked and normal   Heart score calculated at 4   Patient admitted to [facility] for cardiac obs        Amount and/or Complexity of Data Reviewed  Clinical lab tests: ordered and reviewed  Tests in the radiology section of CPT®: ordered and reviewed  Tests in the medicine section of CPT®: reviewed and ordered  Decide to obtain previous medical records or to obtain history from someone other than the patient: yes  Review and summarize past medical records: yes  Independent visualization of images, tracings, or specimens: yes    Risk of Complications, Morbidity, and/or Mortality  Presenting problems: moderate  Diagnostic procedures: minimal  Management options: minimal    Patient Progress  Patient progress: improved        Disposition  Final diagnoses:   Chest pain   Near syncope     Time reflects when diagnosis was documented in both MDM as applicable and the Disposition within this note     Time User Action Codes Description Comment    12/17/2019  5:31 PM Mel Betancourt Ashley [R07 9] Chest pain     12/17/2019  5:32 PM Mel Serafin Ramirez [R55] Near syncope       ED Disposition     ED Disposition Condition Date/Time Comment    Admit Stable Tue Dec 17, 2019  5:30 PM Case was discussed with [facility] and the patient's admission status was agreed to be Admission Status: observation status to the service of Dr Ishan Arambula   Follow-up Information    None         Current Discharge Medication List      CONTINUE these medications which have NOT CHANGED    Details   lisinopril (ZESTRIL) 10 mg tablet Take 5 mg by mouth       omeprazole (PriLOSEC) 20 mg delayed release capsule Take 40 mg by mouth daily            No discharge procedures on file  ED Provider  Attending physically available and evaluated [facility]  I managed the patient along with the ED Attending      Electronically Signed by         Eleni Sanders MD  12/18/19 4804

## 2019-12-18 ENCOUNTER — TELEPHONE (OUTPATIENT)
Dept: SLEEP CENTER | Facility: CLINIC | Age: 44
End: 2019-12-18

## 2019-12-18 VITALS
OXYGEN SATURATION: 97 % | RESPIRATION RATE: 17 BRPM | DIASTOLIC BLOOD PRESSURE: 79 MMHG | SYSTOLIC BLOOD PRESSURE: 121 MMHG | TEMPERATURE: 98.1 F | HEIGHT: 64 IN | BODY MASS INDEX: 41.78 KG/M2 | HEART RATE: 115 BPM | WEIGHT: 244.71 LBS

## 2019-12-18 LAB
ANION GAP SERPL CALCULATED.3IONS-SCNC: 5 MMOL/L (ref 4–13)
BUN SERPL-MCNC: 18 MG/DL (ref 5–25)
CALCIUM SERPL-MCNC: 8.9 MG/DL (ref 8.3–10.1)
CHLORIDE SERPL-SCNC: 106 MMOL/L (ref 100–108)
CO2 SERPL-SCNC: 26 MMOL/L (ref 21–32)
CREAT SERPL-MCNC: 0.94 MG/DL (ref 0.6–1.3)
ERYTHROCYTE [DISTWIDTH] IN BLOOD BY AUTOMATED COUNT: 13.9 % (ref 11.6–15.1)
GFR SERPL CREATININE-BSD FRML MDRD: 100 ML/MIN/1.73SQ M
GLUCOSE SERPL-MCNC: 100 MG/DL (ref 65–140)
HCT VFR BLD AUTO: 44 % (ref 36.5–49.3)
HGB BLD-MCNC: 14.3 G/DL (ref 12–17)
MCH RBC QN AUTO: 28.9 PG (ref 26.8–34.3)
MCHC RBC AUTO-ENTMCNC: 32.5 G/DL (ref 31.4–37.4)
MCV RBC AUTO: 89 FL (ref 82–98)
PLATELET # BLD AUTO: 249 THOUSANDS/UL (ref 149–390)
PMV BLD AUTO: 10.3 FL (ref 8.9–12.7)
POTASSIUM SERPL-SCNC: 4 MMOL/L (ref 3.5–5.3)
RBC # BLD AUTO: 4.94 MILLION/UL (ref 3.88–5.62)
SODIUM SERPL-SCNC: 137 MMOL/L (ref 136–145)
WBC # BLD AUTO: 7.24 THOUSAND/UL (ref 4.31–10.16)

## 2019-12-18 PROCEDURE — 80048 BASIC METABOLIC PNL TOTAL CA: CPT | Performed by: INTERNAL MEDICINE

## 2019-12-18 PROCEDURE — 99219 PR INITIAL OBSERVATION CARE/DAY 50 MINUTES: CPT | Performed by: INTERNAL MEDICINE

## 2019-12-18 PROCEDURE — 85027 COMPLETE CBC AUTOMATED: CPT | Performed by: INTERNAL MEDICINE

## 2019-12-18 PROCEDURE — NC001 PR NO CHARGE: Performed by: INTERNAL MEDICINE

## 2019-12-18 RX ORDER — FLUTICASONE PROPIONATE 50 MCG
1 SPRAY, SUSPENSION (ML) NASAL DAILY
Qty: 11.1 ML | Refills: 0 | Status: SHIPPED | OUTPATIENT
Start: 2019-12-18

## 2019-12-18 RX ADMIN — PANTOPRAZOLE SODIUM 40 MG: 40 TABLET, DELAYED RELEASE ORAL at 06:30

## 2019-12-18 NOTE — UTILIZATION REVIEW
Initial Clinical Review    Admission: Date/Time/Statement: 12/17/2019 @ 1733  Orders Placed This Encounter   Procedures    Place in Observation     Standing Status:   Standing     Number of Occurrences:   1     Order Specific Question:   Admitting Physician     Answer:   Fidencio Pino     Order Specific Question:   Level of Care     Answer:   Med Surg [16]     ED Arrival Information     Expected Arrival Acuity Means of Arrival Escorted By Service Admission Type    - 12/17/2019 14:53 Emergent Walk-In Self General Medicine Emergency    Arrival Complaint    uri        Chief Complaint   Patient presents with    Dizziness     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, palpitations and dizziness  Assessment/Plan: 45year old male, presented to the ED from home via car  Admitted as Observation due to Chest pain  Has been experiencing exertional chest pain for the past 2-3 weeks  Today felt pain at rest that lasted 25 mins accompanied by diaphoresis and palpitations  The pain had resolved by the time he got to the ED without intervention  Initial troponin was negative  EKG showed sinus tachycardia  Admitted for ACS rule out with heart score 4  Trend troponin x3  Monitor on telemetry        ED Triage Vitals [12/17/19 1501]   Temperature Pulse Respirations Blood Pressure SpO2   98 1 °F (36 7 °C) (!) 120 18 126/88 98 %      Temp Source Heart Rate Source Patient Position - Orthostatic VS BP Location FiO2 (%)   Oral Monitor Sitting Right arm --      Pain Score       5        Wt Readings from Last 1 Encounters:   12/17/19 111 kg (244 lb 11 4 oz)     Additional Vital Signs:   Date/Time  Temp  Pulse  Resp  BP  MAP (mmHg)  SpO2  O2 Device  Patient Position - Orthostatic VS   12/18/19 0300  --  93  --  --  --  93 %  --  --   12/18/19 0100  --  107Abnormal   --  --  --  92 %  --  --   12/17/19 2300  --  111Abnormal   --  112/77  89  100 %  --  --   12/17/19 22:59:51  --  111Abnormal   18 112/77  89  100 %  --  --   12/17/19 1944  --  --  --  --  --  --  None (Room air)  --   12/17/19 19:11:16  99 2 °F (37 3 °C)  103  18  123/88  100  96 %  --  --   12/17/19 1900  --  --  --  105/68  81  --  --  --   12/17/19 1840  --  97  20  120/73  --  95 %  None (Room air)  Lying   12/17/19 1623  --  104  18  108/81  --  99 %  None (Room air)  Sitting     Pertinent Labs/Diagnostic Test Results:   12/17/2019 @ 1652  Chest X:  No acute cardiopulmonary disease      12/17/2019   EKG demonstrating sinus tachycardia,    Results from last 7 days   Lab Units 12/18/19 0459 12/17/19 1858 12/17/19  1558   WBC Thousand/uL 7 24  --  7 82   HEMOGLOBIN g/dL 14 3  --  15 3   HEMATOCRIT % 44 0  --  45 1   PLATELETS Thousands/uL 249 248 279   NEUTROS ABS Thousands/µL  --   --  4 96     Results from last 7 days   Lab Units 12/18/19  0459 12/17/19  1558   SODIUM mmol/L 137 138   POTASSIUM mmol/L 4 0 4 3   CHLORIDE mmol/L 106 108   CO2 mmol/L 26 24   ANION GAP mmol/L 5 6   BUN mg/dL 18 19   CREATININE mg/dL 0 94 1 06   EGFR ml/min/1 73sq m 100 86   CALCIUM mg/dL 8 9 8 8     Results from last 7 days   Lab Units 12/17/19  1558   AST U/L 33   ALT U/L 61   ALK PHOS U/L 77   TOTAL PROTEIN g/dL 7 8   ALBUMIN g/dL 3 5   TOTAL BILIRUBIN mg/dL 0 22     Results from last 7 days   Lab Units 12/18/19  0459 12/17/19  1558   GLUCOSE RANDOM mg/dL 100 102     Results from last 7 days   Lab Units 12/17/19  1858 12/17/19  1558   TROPONIN I ng/mL <0 02 <0 02     Results from last 7 days   Lab Units 12/17/19  1642   D-DIMER QUANTITATIVE ug/ml FEU 0 37     ED Treatment:   Medication Administration from 12/17/2019 1453 to 12/17/2019 1908     None        Past Medical History:   Diagnosis Date    Anxiety     mild    Back pain, thoracic     Last Assessed [id number]    Degeneration of cervical intervertebral disc     Last Assessed [id number]    Depression     Hypertension     Myofascial pain syndrome     Last Assessed [id number]    Neck pain     Last Assessed [id number]    Trauma     Rectum    Upper back pain     Last Assessed [id number]     Present on Admission:   Benign essential hypertension   Chest pain   Dyspepsia   Obesity (BMI 30-39  9)    Admitting Diagnosis: Chest pain [R07 9]  URI (upper respiratory infection) [J06 9]  Near syncope [R55]  Age/Sex: 45 y o  male  Admission Orders:  Scheduled Medications:  Medications:  enoxaparin 40 mg Subcutaneous Daily   lisinopril 5 mg Oral Daily   nicotine 1 patch Transdermal Daily   pantoprazole 40 mg Oral Early Morning   Continuous IV Infusions:    PRN Meds:  TELM  Rob SCDs    12/18/2019 DC order entered  Home  Network Utilization Review Department  [email or web address]  ATTENTION: Please call with any questions or concerns to [phone] and carefully listen to the prompts so that you are directed to the right person  All voicemails are confidential   Smiley Soliman all requests for admission clinical reviews, approved or denied determinations and any other requests to dedicated fax number below belonging to the campus where the patient is receiving treatment   List of dedicated fax numbers for the Facilities:  [address] [place] [address] DENIALS (Administrative/Medical Necessity) [phone]   [address] (Maternity/NICU/Pediatrics) [phone]   Shaneka Michel [phone]   [facility] [phone]   [place] [phone]   M[facility] [place] [phone]   [address] [facility] [address] [place] [address] [phone]   Tamie Garrison [phone]   [address] [facility]  [address] [facility] [address] [facility] [phone]

## 2019-12-18 NOTE — DISCHARGE SUMMARY
INTERNAL MEDICINE RESIDENCY DISCHARGE SUMMARY  Danitza Solorzano   45 y o  male  MRN: [id number]  Room/Bed: [facility] [address]    Encounter: [id number]    Principal Problem:    Chest pain  Active Problems:    Benign essential hypertension    Dyspepsia    Obesity (BMI 30-39  9)    Assessment and plan from 12/17/2019:    Benign essential hypertension  Assessment & Plan  Chronic  Stable  BP well controlled on admission    - continue home lisinopril 5mg daily   - monitor BP    * Chest pain  Assessment & Plan  Has been experiencing exertional chest pain for the past 2-3 weeks  Today felt pain at rest that lasted 25 mins accompanied by diaphoresis and palpitations  The pain had resolved by the time he got to the ED without intervention  Initial troponin was negative  EKG showed sinus tachycardia  Admitted for ACS rule out with heart score 4  - Trend troponin x3  - monitor on telemetry          DISCHARGE INFORMATION       Admitting Provider: Camacho Rincon DO  Discharge Provider: No att  providers found  Primary Care Physician at Discharge: Carole Shi PA-C    Discharge To: Home / self-care    Admission Date: 12/17/2019     Discharge Date: 12/18/2019 10:11 AM    Primary Discharge Diagnosis  Principal Problem:    Chest pain  Active Problems:    Benign essential hypertension    Dyspepsia    Obesity (BMI 30-39  9)  Resolved Problems:    * No resolved hospital problems  *      Other Problems Addressed: None    Consulting Providers: None      Diagnostic & Therapeutic Procedures Performed:  Xr Chest 2 Views    Result Date: 12/17/2019  Impression: No acute cardiopulmonary disease   Workstation performed: [id number]         Discharge Disposition: Home/Self Care  Discharged With Lines: no    Test Results Pending at Discharge: None    Outpatient Follow-Up    - Call [address] to schedule appointment with Carole Shi PA-C, within 2 weeks    Active Issues Requiring Follow-up: None    Code Status: Prior  Advance Directive & Living Will: <no information>    Medications   Discharge Medication List as of 12/18/2019 10:02 AM        Discharge Medication List as of 12/18/2019 10:02 AM      START taking these medications    Details   fluticasone (FLONASE) 50 mcg/act nasal spray 1 spray into each nostril daily, Starting Wed 12/18/2019, Normal      guaiFENesin (ROBITUSSIN) 100 MG/5ML oral liquid Take 10 mL (200 mg total) by mouth 3 (three) times a day as needed for cough, Starting Wed 12/18/2019, Normal           Discharge Medication List as of 12/18/2019 10:02 AM      CONTINUE these medications which have NOT CHANGED    Details   lisinopril (ZESTRIL) 10 mg tablet Take 5 mg by mouth , Historical Med      omeprazole (PriLOSEC) 20 mg delayed release capsule Take 40 mg by mouth daily , Historical Med             Allergies  Allergies   Allergen Reactions    Dust Mite Extract     Morphine     Penicillins     Pollen Extract      Discharge Diet: regular diet  Activity restrictions: none        Jocelyn Payne is a 57-year-old male with a past medical history of hypertension, tobacco use (approximately 20 pack-year), and morbid obesity (BMI 30-39 9) presented to the emergency department the evening of 12/17/2019 for evaluation of a 25 minute episode of nonradiating chest discomfort, diaphoresis, and palpitations that occurred while at work  Symptoms resolved without intervention prior to arrival to the emergency department  This in the setting of 2-3 weeks of subjective chest tightness and dyspnea on exertion  Initial vital signs as follows:  Temperature 98 1° F, heart rate 120, respiratory rate 18, blood pressure 126/88, 90% oxygen saturation on room air  Physical examination was significant for tachycardia without murmurs, rubs, or gallops without respiratory distress    Initial laboratory evaluation revealed BMP within normal limits, negative troponins, CBC within normal limits, and D-dimer within normal limits  Chest x-ray negative for acute cardiopulmonary disease  Patient was subsequently admitted to the general medicine service for ACS rule out with a heart score 4  Patient remained hemodynamically stable over the next 12 hours and serial troponins were negative  Repeat BMP and CBC were within normal limits  Upon examination the morning of 12/18/2019, patient endorsed dry cough with associated central, nonradiating upper chest pain with complete resolution of symptoms between coughing episode  Denied vision changes, lightheadedness, dizziness, chest pain, shortness of breath, diaphoresis, nausea, vomiting, abdominal pain, leg pain, and leg swelling  Patient was deemed medically stable the morning of 12/18/2019 and was subsequently discharged from the General Medicine service later that morning with instructions for outpatient follow-up detailed in the after visit summary  Presenting Problem/History of Present Illness  Principal Problem:    Chest pain  Active Problems:    Benign essential hypertension    Dyspepsia    Obesity (BMI 30-39  9)  Resolved Problems:    * No resolved hospital problems  *    Patient seen and examined the morning of 12/18/2019:    No acute events overnight  Patient sleeping supine in bed upon entry  States that he is feeling okay  Endorses dry cough with associated central, nonradiating upper chest pain with complete resolution of symptoms between coughing episodes  Denies headaches, fevers, chills, vision changes, lightheadedness, dizziness, chest pain, shortness of breath, diaphoresis, nausea, vomiting, abdominal pain, diarrhea, constipation, dysuria, leg pain, and leg swelling  Patient expressed understanding of medical plan and had no questions at conclusion of encounter  Physical Exam   Constitutional: He is oriented to person, place, and time  He appears well-developed   No distress  HENT:   Head: Normocephalic and atraumatic  Mouth/Throat: Oropharynx is clear and moist  No oropharyngeal exudate  Eyes: Pupils are equal, round, and reactive to light  Conjunctivae and EOM are normal  No scleral icterus  Neck: Neck supple  No JVD present  Cardiovascular: Normal rate, regular rhythm, normal heart sounds and intact distal pulses  Exam reveals no gallop and no friction rub  No murmur heard  Pulmonary/Chest: Effort normal and breath sounds normal  No respiratory distress  He has no wheezes  He exhibits no tenderness  Abdominal: Soft  Bowel sounds are normal  He exhibits no distension  There is no tenderness  Musculoskeletal: He exhibits edema  He exhibits no tenderness or deformity  Trace bilateral lower extremity pitting edema   Neurological: He is alert and oriented to person, place, and time  No cranial nerve deficit  Skin: Skin is warm and dry  He is not diaphoretic  No erythema  No pallor  Psychiatric: He has a normal mood and affect  His behavior is normal    Vitals reviewed  Discharge Condition: stable      Discharge  Statement   I spent 15 minutes minutes discharging the patient  This time was spent on the day of discharge  I had direct contact with the patient on the day of discharge  Additional documentation is required if more than 30 minutes were spent on discharge

## 2019-12-18 NOTE — PROGRESS NOTES
[facility] Admission Note   Unit/Bed # @DBLINK (KIA,78355)@ Encounter: [id number]  SOD Team A          Malena Gallo 45 y o  male [id number]       Patient seen and examined  Reviewed H&P per Dr Eldred Cogan  Agree with the assessment and plan except:     Assessment/Plan: Principal Problem:    Chest pain  Active Problems:    Benign essential hypertension    Dyspepsia    Obesity (BMI 30-39  9)     Patient is a 53-year-old male with past medical history of hypertension, hyperlipidemia, current tobacco abuse who presents after having an episode of sudden onset upper chest/throat tightening with associated palpitations, and shortness of breath while driving  Episode lasted approximately 30 minutes and resolved on its own  Patient has had similar episodes over the last 2-3 weeks associated with exertion  On admission, patient with sinus tachycardia, otherwise hemodynamically stable  Troponins negative x1  Evidence of possible S1 q 3 T3 on EKG with no prior EKG for comparison  D-dimer within normal limits  Family history significant for heart attack in the patient's father at age 58  Patient is an active smoker  Chest pain has resolved since the patient has been in the hospital   Patient admitted for further monitoring and ACS rule out  Continue to monitor on telemetry  Continue to trend troponins 1 more time  Anticipate likely DC tomorrow    Can consider inpatient versus outpatient stress test for further assessment      Disposition:  OBSERVATION    Expected LOS: >2 Midnights      Tello Alas DO

## 2019-12-18 NOTE — TELEPHONE ENCOUNTER
----- Message from Nasim Cavazos MD sent at 12/18/2019  9:00 AM EST -----  Approved  ----- Message -----  From: Vale Montes De Oca: 12/16/2019   2:20 PM EST  To: Sleep Medicine [facility] [place], #    PLEASE REVIEW FOR APPROVAL OR DENIAL AND WHY

## 2019-12-18 NOTE — PLAN OF CARE
Problem: PAIN - ADULT  Goal: Verbalizes/displays adequate comfort level or baseline comfort level  Description  Interventions:  - Encourage patient to monitor pain and request assistance  - Assess pain using appropriate pain scale  - Administer analgesics based on type and severity of pain and evaluate response  - Implement non-pharmacological measures as appropriate and evaluate response  - Consider cultural and social influences on pain and pain management  - Notify physician/advanced practitioner if interventions unsuccessful or patient reports new pain  Outcome: Progressing     Problem: INFECTION - ADULT  Goal: Absence or prevention of progression during hospitalization  Description  INTERVENTIONS:  - Assess and monitor for signs and symptoms of infection  - Monitor lab/diagnostic results  - Monitor all insertion sites, i e  indwelling lines, tubes, and drains  - Monitor endotracheal if appropriate and nasal secretions for changes in amount and color  - [place] appropriate cooling/warming therapies per order  - Administer medications as ordered  - Instruct and encourage patient and family to use good hand hygiene technique  - Identify and instruct in appropriate isolation precautions for identified infection/condition  Outcome: Progressing     Problem: SAFETY ADULT  Goal: Patient will remain free of falls  Description  INTERVENTIONS:  - Assess patient frequently for physical needs  -  Identify cognitive and physical deficits and behaviors that affect risk of falls    -  [place] fall precautions as indicated by assessment   - Educate patient/family on patient safety including physical limitations  - Instruct patient to call for assistance with activity based on assessment  - Modify environment to reduce risk of injury  - Consider OT/PT consult to assist with strengthening/mobility  Outcome: Progressing     Problem: DISCHARGE PLANNING  Goal: Discharge to home or other facility with appropriate resources  Description  INTERVENTIONS:  - Identify barriers to discharge w/patient and caregiver  - Arrange for needed discharge resources and transportation as appropriate  - Identify discharge learning needs (meds, wound care, etc )  - Arrange for interpretive services to assist at discharge as needed  - Refer to Case Management Department for coordinating discharge planning if the patient needs post-hospital services based on physician/advanced practitioner order or complex needs related to functional status, cognitive ability, or social support system  Outcome: Progressing     Problem: Knowledge Deficit  Goal: Patient/family/caregiver demonstrates understanding of disease process, treatment plan, medications, and discharge instructions  Description  Complete learning assessment and assess knowledge base    Interventions:  - Provide teaching at level of understanding  - Provide teaching via preferred learning methods  Outcome: Progressing

## 2019-12-19 ENCOUNTER — TRANSITIONAL CARE MANAGEMENT (OUTPATIENT)
Dept: INTERNAL MEDICINE CLINIC | Facility: CLINIC | Age: 44
End: 2019-12-19

## 2019-12-19 LAB
ATRIAL RATE: 116 BPM
P AXIS: 50 DEGREES
PR INTERVAL: 130 MS
QRS AXIS: 72 DEGREES
QRSD INTERVAL: 80 MS
QT INTERVAL: 294 MS
QTC INTERVAL: 408 MS
T WAVE AXIS: 32 DEGREES
VENTRICULAR RATE: 116 BPM

## 2019-12-19 PROCEDURE — 93010 ELECTROCARDIOGRAM REPORT: CPT | Performed by: INTERNAL MEDICINE

## 2020-12-06 ENCOUNTER — TRANSCRIBE ORDERS (OUTPATIENT)
Dept: LAB | Facility: HOSPITAL | Age: 45
End: 2020-12-06

## 2020-12-06 ENCOUNTER — LAB (OUTPATIENT)
Dept: LAB | Facility: HOSPITAL | Age: 45
End: 2020-12-06
Payer: COMMERCIAL

## 2020-12-06 DIAGNOSIS — R53.83 OTHER FATIGUE: Primary | ICD-10-CM

## 2020-12-06 DIAGNOSIS — R53.83 OTHER FATIGUE: ICD-10-CM

## 2020-12-06 LAB
25(OH)D3 SERPL-MCNC: 32.1 NG/ML (ref 30–100)
ALBUMIN SERPL BCP-MCNC: 3.6 G/DL (ref 3.5–5)
ALP SERPL-CCNC: 70 U/L (ref 46–116)
ALT SERPL W P-5'-P-CCNC: 57 U/L (ref 12–78)
ANION GAP SERPL CALCULATED.3IONS-SCNC: 4 MMOL/L (ref 4–13)
AST SERPL W P-5'-P-CCNC: 24 U/L (ref 5–45)
BASOPHILS # BLD AUTO: 0.05 THOUSANDS/ΜL (ref 0–0.1)
BASOPHILS NFR BLD AUTO: 1 % (ref 0–1)
BILIRUB SERPL-MCNC: 0.36 MG/DL (ref 0.2–1)
BUN SERPL-MCNC: 17 MG/DL (ref 5–25)
CALCIUM SERPL-MCNC: 9.1 MG/DL (ref 8.3–10.1)
CHLORIDE SERPL-SCNC: 110 MMOL/L (ref 100–108)
CHOLEST SERPL-MCNC: 213 MG/DL (ref 50–200)
CO2 SERPL-SCNC: 29 MMOL/L (ref 21–32)
CREAT SERPL-MCNC: 0.89 MG/DL (ref 0.6–1.3)
EOSINOPHIL # BLD AUTO: 0.33 THOUSAND/ΜL (ref 0–0.61)
EOSINOPHIL NFR BLD AUTO: 4 % (ref 0–6)
ERYTHROCYTE [DISTWIDTH] IN BLOOD BY AUTOMATED COUNT: 13.7 % (ref 11.6–15.1)
EST. AVERAGE GLUCOSE BLD GHB EST-MCNC: 126 MG/DL
GFR SERPL CREATININE-BSD FRML MDRD: 105 ML/MIN/1.73SQ M
GLUCOSE P FAST SERPL-MCNC: 98 MG/DL (ref 65–99)
HBA1C MFR BLD: 6 %
HCT VFR BLD AUTO: 46.9 % (ref 36.5–49.3)
HDLC SERPL-MCNC: 42 MG/DL
HGB BLD-MCNC: 15.1 G/DL (ref 12–17)
IMM GRANULOCYTES # BLD AUTO: 0.02 THOUSAND/UL (ref 0–0.2)
IMM GRANULOCYTES NFR BLD AUTO: 0 % (ref 0–2)
LDLC SERPL CALC-MCNC: 142 MG/DL (ref 0–100)
LYMPHOCYTES # BLD AUTO: 2.77 THOUSANDS/ΜL (ref 0.6–4.47)
LYMPHOCYTES NFR BLD AUTO: 33 % (ref 14–44)
MCH RBC QN AUTO: 29.2 PG (ref 26.8–34.3)
MCHC RBC AUTO-ENTMCNC: 32.2 G/DL (ref 31.4–37.4)
MCV RBC AUTO: 91 FL (ref 82–98)
MONOCYTES # BLD AUTO: 0.77 THOUSAND/ΜL (ref 0.17–1.22)
MONOCYTES NFR BLD AUTO: 9 % (ref 4–12)
NEUTROPHILS # BLD AUTO: 4.41 THOUSANDS/ΜL (ref 1.85–7.62)
NEUTS SEG NFR BLD AUTO: 53 % (ref 43–75)
NONHDLC SERPL-MCNC: 171 MG/DL
NRBC BLD AUTO-RTO: 0 /100 WBCS
PLATELET # BLD AUTO: 265 THOUSANDS/UL (ref 149–390)
PMV BLD AUTO: 10 FL (ref 8.9–12.7)
POTASSIUM SERPL-SCNC: 4.2 MMOL/L (ref 3.5–5.3)
PROT SERPL-MCNC: 7.3 G/DL (ref 6.4–8.2)
RBC # BLD AUTO: 5.18 MILLION/UL (ref 3.88–5.62)
SODIUM SERPL-SCNC: 143 MMOL/L (ref 136–145)
TRIGL SERPL-MCNC: 145 MG/DL
TSH SERPL DL<=0.05 MIU/L-ACNC: 1.03 UIU/ML (ref 0.36–3.74)
WBC # BLD AUTO: 8.35 THOUSAND/UL (ref 4.31–10.16)

## 2020-12-06 PROCEDURE — 36415 COLL VENOUS BLD VENIPUNCTURE: CPT

## 2020-12-06 PROCEDURE — 85025 COMPLETE CBC W/AUTO DIFF WBC: CPT

## 2020-12-06 PROCEDURE — 80061 LIPID PANEL: CPT

## 2020-12-06 PROCEDURE — 80053 COMPREHEN METABOLIC PANEL: CPT

## 2020-12-06 PROCEDURE — 82306 VITAMIN D 25 HYDROXY: CPT

## 2020-12-06 PROCEDURE — 84443 ASSAY THYROID STIM HORMONE: CPT

## 2020-12-06 PROCEDURE — 83036 HEMOGLOBIN GLYCOSYLATED A1C: CPT

## 2021-01-21 ENCOUNTER — TRANSCRIBE ORDERS (OUTPATIENT)
Dept: ADMINISTRATIVE | Facility: HOSPITAL | Age: 46
End: 2021-01-21

## 2021-01-21 DIAGNOSIS — R74.01 ELEVATION OF LEVELS OF LIVER TRANSAMINASE LEVELS: Primary | ICD-10-CM

## 2021-01-29 ENCOUNTER — HOSPITAL ENCOUNTER (OUTPATIENT)
Dept: RADIOLOGY | Facility: HOSPITAL | Age: 46
Discharge: HOME | End: 2021-01-29
Payer: COMMERCIAL

## 2021-01-29 ENCOUNTER — TRANSCRIBE ORDERS (OUTPATIENT)
Dept: LAB | Facility: HOSPITAL | Age: 46
End: 2021-01-29

## 2021-01-29 ENCOUNTER — LAB (OUTPATIENT)
Dept: LAB | Facility: HOSPITAL | Age: 46
End: 2021-01-29
Payer: COMMERCIAL

## 2021-01-29 DIAGNOSIS — R74.01 NONSPECIFIC ELEVATION OF LEVELS OF TRANSAMINASE OR LACTIC ACID DEHYDROGENASE (LDH): Primary | ICD-10-CM

## 2021-01-29 DIAGNOSIS — R74.02 NONSPECIFIC ELEVATION OF LEVELS OF TRANSAMINASE OR LACTIC ACID DEHYDROGENASE (LDH): Primary | ICD-10-CM

## 2021-01-29 DIAGNOSIS — R74.01 NONSPECIFIC ELEVATION OF LEVELS OF TRANSAMINASE OR LACTIC ACID DEHYDROGENASE (LDH): ICD-10-CM

## 2021-01-29 DIAGNOSIS — R74.02 NONSPECIFIC ELEVATION OF LEVELS OF TRANSAMINASE OR LACTIC ACID DEHYDROGENASE (LDH): ICD-10-CM

## 2021-01-29 DIAGNOSIS — R74.01 ELEVATION OF LEVELS OF LIVER TRANSAMINASE LEVELS: ICD-10-CM

## 2021-01-29 LAB
FERRITIN SERPL-MCNC: 127 NG/ML (ref 8–388)
HAV AB SER QL IA: NORMAL
HBV SURFACE AB SER-ACNC: <3.1 MIU/ML
HBV SURFACE AG SER QL: NORMAL
HCV AB SER QL: NORMAL
INR PPP: 0.91 (ref 0.84–1.19)
IRON SERPL-MCNC: 106 UG/DL (ref 65–175)
PROTHROMBIN TIME: 12.2 SECONDS (ref 11.6–14.5)
TRANSFERRIN SERPL-MCNC: 221 MG/DL (ref 200–400)

## 2021-01-29 PROCEDURE — 82104 ALPHA-1-ANTITRYPSIN PHENO: CPT

## 2021-01-29 PROCEDURE — 86256 FLUORESCENT ANTIBODY TITER: CPT

## 2021-01-29 PROCEDURE — 86708 HEPATITIS A ANTIBODY: CPT

## 2021-01-29 PROCEDURE — 87340 HEPATITIS B SURFACE AG IA: CPT

## 2021-01-29 PROCEDURE — 83516 IMMUNOASSAY NONANTIBODY: CPT

## 2021-01-29 PROCEDURE — 86038 ANTINUCLEAR ANTIBODIES: CPT

## 2021-01-29 PROCEDURE — 86235 NUCLEAR ANTIGEN ANTIBODY: CPT

## 2021-01-29 PROCEDURE — 36415 COLL VENOUS BLD VENIPUNCTURE: CPT

## 2021-01-29 PROCEDURE — 84165 PROTEIN E-PHORESIS SERUM: CPT

## 2021-01-29 PROCEDURE — 83540 ASSAY OF IRON: CPT

## 2021-01-29 PROCEDURE — 84165 PROTEIN E-PHORESIS SERUM: CPT | Performed by: PATHOLOGY

## 2021-01-29 PROCEDURE — 86376 MICROSOMAL ANTIBODY EACH: CPT

## 2021-01-29 PROCEDURE — 85610 PROTHROMBIN TIME: CPT

## 2021-01-29 PROCEDURE — 82390 ASSAY OF CERULOPLASMIN: CPT

## 2021-01-29 PROCEDURE — 76705 ECHO EXAM OF ABDOMEN: CPT

## 2021-01-29 PROCEDURE — 86803 HEPATITIS C AB TEST: CPT

## 2021-01-29 PROCEDURE — 82103 ALPHA-1-ANTITRYPSIN TOTAL: CPT

## 2021-01-29 PROCEDURE — 82728 ASSAY OF FERRITIN: CPT

## 2021-01-29 PROCEDURE — 84466 ASSAY OF TRANSFERRIN: CPT

## 2021-01-29 PROCEDURE — 86706 HEP B SURFACE ANTIBODY: CPT

## 2021-01-30 LAB
A1AT SERPL-MCNC: 126 MG/DL (ref 101–187)
ACTIN IGG SERPL-ACNC: 4 UNITS (ref 0–19)
CERULOPLASMIN SERPL-MCNC: 22.2 MG/DL (ref 16–31)
LKM-1 AB SER-ACNC: <20.1 UNITS (ref 0–20)
TTG IGA SER-ACNC: <2 U/ML (ref 0–3)
TTG IGG SER-ACNC: 5 U/ML (ref 0–5)

## 2021-02-01 LAB
ALBUMIN SERPL ELPH-MCNC: 4.28 G/DL (ref 3.5–5)
ALBUMIN SERPL ELPH-MCNC: 58.6 % (ref 52–65)
ALPHA1 GLOB SERPL ELPH-MCNC: 0.26 G/DL (ref 0.1–0.4)
ALPHA1 GLOB SERPL ELPH-MCNC: 3.6 % (ref 2.5–5)
ALPHA2 GLOB SERPL ELPH-MCNC: 0.81 G/DL (ref 0.4–1.2)
ALPHA2 GLOB SERPL ELPH-MCNC: 11.1 % (ref 7–13)
BETA GLOB ABNORMAL SERPL ELPH-MCNC: 0.42 G/DL (ref 0.4–0.8)
BETA1 GLOB SERPL ELPH-MCNC: 5.7 % (ref 5–13)
BETA2 GLOB SERPL ELPH-MCNC: 6.7 % (ref 2–8)
BETA2+GAMMA GLOB SERPL ELPH-MCNC: 0.49 G/DL (ref 0.2–0.5)
GAMMA GLOB ABNORMAL SERPL ELPH-MCNC: 1.04 G/DL (ref 0.5–1.6)
GAMMA GLOB SERPL ELPH-MCNC: 14.3 % (ref 12–22)
IGG/ALB SER: 1.42 {RATIO} (ref 1.1–1.8)
PROT PATTERN SERPL ELPH-IMP: NORMAL
PROT SERPL-MCNC: 7.3 G/DL (ref 6.4–8.2)

## 2021-02-03 LAB
A1AT PHENOTYP SERPL IFE: NORMAL
A1AT SERPL-MCNC: 117 MG/DL (ref 101–187)
MITOCHONDRIA M2 IGG SER-ACNC: <20 UNITS (ref 0–20)
RYE IGE QN: NEGATIVE

## 2021-04-21 ENCOUNTER — TRANSCRIBE ORDERS (OUTPATIENT)
Dept: ADMINISTRATIVE | Facility: HOSPITAL | Age: 46
End: 2021-04-21

## 2021-04-21 DIAGNOSIS — K76.0 FATTY LIVER: Primary | ICD-10-CM
